# Patient Record
Sex: FEMALE | Race: WHITE | NOT HISPANIC OR LATINO | Employment: OTHER | URBAN - NONMETROPOLITAN AREA
[De-identification: names, ages, dates, MRNs, and addresses within clinical notes are randomized per-mention and may not be internally consistent; named-entity substitution may affect disease eponyms.]

---

## 2020-12-14 LAB
ALBUMIN SERPL-MCNC: 4.7 G/DL (ref 3.4–5)
ALBUMIN/GLOB SERPL: 1.5 {RATIO}
ALP SERPL-CCNC: 53 U/L (ref 50–144)
ALT SERPL-CCNC: 12 U/L (ref 1–45)
ANION GAP SERPL CALC-SCNC: 8 MMOL/L (ref 7–16)
AST SERPL-CCNC: 20 U/L (ref 14–36)
BASOPHILS # BLD AUTO: 0.02 X10(3)/MCL (ref 0.01–0.08)
BASOPHILS NFR BLD AUTO: 0.3 % (ref 0.1–1.2)
BILIRUB SERPL-MCNC: 0.53 MG/DL (ref 0.1–1)
BUN SERPL-MCNC: 17 MG/DL (ref 7–20)
CALCIUM SERPL-MCNC: 10.7 MG/DL (ref 8.4–10.2)
CHLORIDE SERPL-SCNC: 101 MMOL/L (ref 94–110)
CHOLEST SERPL-MCNC: 214 MG/DL (ref 0–200)
CO2 SERPL-SCNC: 31 MMOL/L (ref 21–32)
CREAT SERPL-MCNC: 0.8 MG/DL (ref 0.52–1.04)
CREAT/UREA NIT SERPL: 21.3 (ref 12–20)
DEPRECATED CALCIDIOL+CALCIFEROL SERPL-MC: 27.4 NG/ML (ref 30–100)
EOSINOPHIL # BLD AUTO: 0.18 X10(3)/MCL (ref 0.04–0.36)
EOSINOPHIL NFR BLD AUTO: 3 % (ref 0.7–7)
ERYTHROCYTE [DISTWIDTH] IN BLOOD BY AUTOMATED COUNT: 13.7 % (ref 11–14.5)
EST. AVERAGE GLUCOSE BLD GHB EST-MCNC: 105 MG/DL (ref 70–115)
GLOBULIN SER-MCNC: 3.1 G/DL (ref 2–3.9)
GLUCOSE SERPL-MCNC: 109 MGM./DL (ref 70–115)
HBA1C MFR BLD: 5.5 % (ref 4–6)
HCT VFR BLD AUTO: 45.1 % (ref 36–48)
HDLC SERPL-MCNC: 53 MG/DL (ref 40–60)
HGB BLD-MCNC: 15.1 G/DL (ref 11.8–16)
IMM GRANULOCYTES # BLD AUTO: 0.01 X10E3/UL (ref 0–0.03)
IMM GRANULOCYTES NFR BLD AUTO: 0.2 % (ref 0–0.5)
LDLC SERPL CALC-MCNC: 119.9 MG/DL (ref 30–100)
LYMPHOCYTES # BLD AUTO: 2.13 X10(3)/MCL (ref 1.16–3.74)
LYMPHOCYTES NFR BLD AUTO: 35.9 % (ref 20–55)
MCH RBC QN AUTO: 31.7 PG (ref 27–34)
MCHC RBC AUTO-ENTMCNC: 33.5 G/DL (ref 31–37)
MCV RBC AUTO: 94.7 FL (ref 79–99)
MONOCYTES # BLD AUTO: 0.39 X10(3)/MCL (ref 0.24–0.36)
MONOCYTES NFR BLD AUTO: 6.6 % (ref 4.7–12.5)
NEUTROPHILS # BLD AUTO: 3.21 X10(3)/MCL (ref 1.56–6.13)
NEUTROPHILS NFR BLD AUTO: 54 % (ref 37–73)
PLATELET # BLD AUTO: 218 X10(3)/MCL (ref 140–371)
PMV BLD AUTO: 12 FL (ref 9.4–12.4)
POTASSIUM SERPL-SCNC: 5.5 MMOL/L (ref 3.5–5.1)
PROT SERPL-MCNC: 7.8 G/DL (ref 6.3–8.2)
RBC # BLD AUTO: 4.76 X10(6)/MCL (ref 4–5.1)
SODIUM SERPL-SCNC: 140 MMOL/L (ref 135–145)
TRIGL SERPL-MCNC: 188 MG/DL (ref 30–200)
TSH SERPL-ACNC: 2.63 UIU/ML (ref 0.36–3.74)
WBC # SPEC AUTO: 5.9 X10(3)/MCL (ref 4–11.5)

## 2021-01-14 ENCOUNTER — HISTORICAL (OUTPATIENT)
Dept: ADMINISTRATIVE | Facility: HOSPITAL | Age: 71
End: 2021-01-14

## 2021-11-08 LAB
BILIRUB SERPL-MCNC: NEGATIVE MG/DL
BLOOD URINE, POC: NEGATIVE
CLARITY, POC UA: CLEAR
COLOR, POC UA: YELLOW
GLUCOSE UR QL STRIP: NEGATIVE
KETONES UR QL STRIP: NEGATIVE
LEUKOCYTE EST, POC UA: NEGATIVE
NITRITE, POC UA: NEGATIVE
PH, POC UA: 6
PROTEIN, POC: NEGATIVE
SPECIFIC GRAVITY, POC UA: 1.02
UROBILINOGEN, POC UA: NORMAL

## 2022-01-28 ENCOUNTER — HISTORICAL (OUTPATIENT)
Dept: ADMINISTRATIVE | Facility: HOSPITAL | Age: 72
End: 2022-01-28

## 2022-03-18 ENCOUNTER — HISTORICAL (OUTPATIENT)
Dept: ADMINISTRATIVE | Facility: HOSPITAL | Age: 72
End: 2022-03-18

## 2022-04-05 LAB
AGE: 69
ALBUMIN SERPL-MCNC: 4.6 G/DL (ref 3.4–5)
ALBUMIN/GLOB SERPL: 1.7 {RATIO}
ALP SERPL-CCNC: 59 U/L (ref 50–144)
ALT SERPL-CCNC: 12 U/L (ref 1–45)
ANION GAP SERPL CALC-SCNC: 8 MMOL/L (ref 2–13)
AST SERPL-CCNC: 20 U/L (ref 14–36)
BASOPHILS # BLD AUTO: 0.03 10*3/UL (ref 0.01–0.08)
BASOPHILS NFR BLD AUTO: 0.7 % (ref 0.1–1.2)
BILIRUB SERPL-MCNC: 0.67 MG/DL (ref 0–1)
BUN SERPL-MCNC: 19 MG/DL (ref 7–20)
CALCIUM SERPL-MCNC: 9.9 MG/DL (ref 8.4–10.2)
CHLORIDE SERPL-SCNC: 101 MMOL/L (ref 94–110)
CHOLEST SERPL-MCNC: 193 MG/DL (ref 0–200)
CO2 SERPL-SCNC: 28 MMOL/L (ref 21–32)
CREAT SERPL-MCNC: 0.94 MG/DL (ref 0.52–1.04)
CREAT/UREA NIT SERPL: 20.2 (ref 12–20)
DEPRECATED CALCIDIOL+CALCIFEROL SERPL-MC: 22.2 NG/ML (ref 30–100)
EOSINOPHIL # BLD AUTO: 0.15 10*3/UL (ref 0.04–0.36)
EOSINOPHIL NFR BLD AUTO: 3.3 % (ref 0.7–7)
ERYTHROCYTE [DISTWIDTH] IN BLOOD BY AUTOMATED COUNT: 14 % (ref 11–14.5)
EST. AVERAGE GLUCOSE BLD GHB EST-MCNC: 105 MG/DL (ref 70–115)
GLOBULIN SER-MCNC: 2.7 G/DL (ref 2–3.9)
GLUCOSE SERPL-MCNC: 107 MG/DL (ref 70–115)
HBA1C MFR BLD: 5.5 % (ref 4–6)
HCT VFR BLD AUTO: 42.1 % (ref 36–48)
HDLC SERPL-MCNC: 50 MG/DL (ref 40–60)
HGB BLD-MCNC: 14.3 G/DL (ref 11.8–16)
IMM GRANULOCYTES # BLD AUTO: 0.01 10*3/UL (ref 0–0.03)
IMM GRANULOCYTES NFR BLD AUTO: 0.2 % (ref 0–0.5)
LDLC SERPL CALC-MCNC: 100.8 MG/DL (ref 30–100)
LYMPHOCYTES # BLD AUTO: 1.68 10*3/UL (ref 1.16–3.74)
LYMPHOCYTES NFR BLD AUTO: 37.4 % (ref 20–55)
MANUAL DIFF? (OHS): NORMAL
MCH RBC QN AUTO: 31.4 PG (ref 27–34)
MCHC RBC AUTO-ENTMCNC: 34 G/DL (ref 31–37)
MCV RBC AUTO: 92.3 FL (ref 79–99)
MONOCYTES # BLD AUTO: 0.36 10*3/UL (ref 0.24–0.36)
MONOCYTES NFR BLD AUTO: 8 % (ref 4.7–12.5)
NEUTROPHILS # BLD AUTO: 2.26 10*3/UL (ref 1.56–6.13)
NEUTROPHILS NFR BLD AUTO: 50.4 % (ref 37–73)
PLATELET # BLD AUTO: 230 10*3/UL (ref 140–371)
PMV BLD AUTO: 12.1 FL (ref 9.4–12.4)
POTASSIUM SERPL-SCNC: 5.3 MMOL/L (ref 3.5–5.1)
PROT SERPL-MCNC: 7.3 G/DL (ref 6.3–8.2)
RBC # BLD AUTO: 4.56 10*6/UL (ref 4–5.1)
SODIUM SERPL-SCNC: 137 MMOL/L (ref 135–145)
TRIGL SERPL-MCNC: 135 MG/DL (ref 30–200)
TSH SERPL-ACNC: 1.77 M[IU]/L (ref 0.36–3.74)
WBC # SPEC AUTO: 4.5 10*3/UL (ref 4–11.5)

## 2022-04-11 ENCOUNTER — HISTORICAL (OUTPATIENT)
Dept: ADMINISTRATIVE | Facility: HOSPITAL | Age: 72
End: 2022-04-11

## 2022-04-27 VITALS
SYSTOLIC BLOOD PRESSURE: 116 MMHG | OXYGEN SATURATION: 94 % | WEIGHT: 174 LBS | DIASTOLIC BLOOD PRESSURE: 64 MMHG | BODY MASS INDEX: 32.02 KG/M2 | HEIGHT: 62 IN

## 2022-05-09 ENCOUNTER — HISTORICAL (OUTPATIENT)
Dept: ADMINISTRATIVE | Facility: HOSPITAL | Age: 72
End: 2022-05-09

## 2022-09-22 ENCOUNTER — HISTORICAL (OUTPATIENT)
Dept: ADMINISTRATIVE | Facility: HOSPITAL | Age: 72
End: 2022-09-22

## 2023-02-01 RX ORDER — ASPIRIN 81 MG/1
81 TABLET ORAL DAILY
COMMUNITY
End: 2023-08-22

## 2023-02-01 RX ORDER — ATORVASTATIN CALCIUM 40 MG/1
40 TABLET, FILM COATED ORAL
COMMUNITY
Start: 2022-10-04 | End: 2023-02-27 | Stop reason: SDUPTHER

## 2023-02-01 RX ORDER — BUSPIRONE HYDROCHLORIDE 15 MG/1
15 TABLET ORAL
COMMUNITY
Start: 2022-10-04 | End: 2023-02-27 | Stop reason: SDUPTHER

## 2023-02-01 RX ORDER — METOPROLOL SUCCINATE 25 MG/1
25 TABLET, EXTENDED RELEASE ORAL
COMMUNITY
Start: 2022-10-04 | End: 2023-02-27 | Stop reason: SDUPTHER

## 2023-02-01 RX ORDER — CHOLECALCIFEROL (VITAMIN D3) 25 MCG
2000 TABLET ORAL DAILY
COMMUNITY
End: 2023-08-22

## 2023-02-01 RX ORDER — PANTOPRAZOLE SODIUM 40 MG/1
40 TABLET, DELAYED RELEASE ORAL DAILY
COMMUNITY
End: 2023-02-27 | Stop reason: SDUPTHER

## 2023-02-01 RX ORDER — GLYCOPYRROLATE AND FORMOTEROL FUMARATE 9; 4.8 UG/1; UG/1
AEROSOL, METERED RESPIRATORY (INHALATION)
COMMUNITY
Start: 2022-10-04 | End: 2023-02-01

## 2023-02-27 DIAGNOSIS — I10 HYPERTENSION, UNSPECIFIED TYPE: ICD-10-CM

## 2023-02-27 DIAGNOSIS — E78.5 HYPERLIPIDEMIA, UNSPECIFIED HYPERLIPIDEMIA TYPE: Primary | ICD-10-CM

## 2023-02-27 DIAGNOSIS — F41.8 MIXED ANXIETY AND DEPRESSIVE DISORDER: ICD-10-CM

## 2023-02-27 DIAGNOSIS — K21.9 GASTROESOPHAGEAL REFLUX DISEASE WITHOUT ESOPHAGITIS: ICD-10-CM

## 2023-02-27 RX ORDER — METOPROLOL SUCCINATE 25 MG/1
25 TABLET, EXTENDED RELEASE ORAL DAILY
Qty: 90 TABLET | Refills: 0 | Status: SHIPPED | OUTPATIENT
Start: 2023-02-27 | End: 2023-04-24 | Stop reason: SDUPTHER

## 2023-02-27 RX ORDER — PANTOPRAZOLE SODIUM 40 MG/1
40 TABLET, DELAYED RELEASE ORAL DAILY
Qty: 90 TABLET | Refills: 0 | Status: SHIPPED | OUTPATIENT
Start: 2023-02-27 | End: 2023-04-24

## 2023-02-27 RX ORDER — ATORVASTATIN CALCIUM 40 MG/1
40 TABLET, FILM COATED ORAL DAILY
Qty: 90 TABLET | Refills: 0 | Status: SHIPPED | OUTPATIENT
Start: 2023-02-27 | End: 2023-04-24 | Stop reason: SDUPTHER

## 2023-02-27 RX ORDER — BUSPIRONE HYDROCHLORIDE 15 MG/1
15 TABLET ORAL 2 TIMES DAILY
Qty: 60 TABLET | Refills: 2 | Status: SHIPPED | OUTPATIENT
Start: 2023-02-27 | End: 2023-04-24

## 2023-03-27 DIAGNOSIS — F32.A ANXIETY AND DEPRESSION: Primary | ICD-10-CM

## 2023-03-27 DIAGNOSIS — F41.9 ANXIETY AND DEPRESSION: Primary | ICD-10-CM

## 2023-03-27 RX ORDER — BUPROPION HYDROCHLORIDE 150 MG/1
150 TABLET, EXTENDED RELEASE ORAL 2 TIMES DAILY
Qty: 60 TABLET | Refills: 0 | Status: SHIPPED | OUTPATIENT
Start: 2023-03-27 | End: 2023-04-24 | Stop reason: DRUGHIGH

## 2023-04-17 PROCEDURE — 83036 HEMOGLOBIN GLYCOSYLATED A1C: CPT | Performed by: NURSE PRACTITIONER

## 2023-04-17 PROCEDURE — 84443 ASSAY THYROID STIM HORMONE: CPT | Performed by: NURSE PRACTITIONER

## 2023-04-17 PROCEDURE — 80061 LIPID PANEL: CPT | Performed by: NURSE PRACTITIONER

## 2023-04-17 PROCEDURE — 82306 VITAMIN D 25 HYDROXY: CPT | Performed by: NURSE PRACTITIONER

## 2023-04-17 PROCEDURE — 80053 COMPREHEN METABOLIC PANEL: CPT | Performed by: NURSE PRACTITIONER

## 2023-04-24 ENCOUNTER — OFFICE VISIT (OUTPATIENT)
Dept: FAMILY MEDICINE | Facility: CLINIC | Age: 73
End: 2023-04-24
Payer: COMMERCIAL

## 2023-04-24 VITALS
WEIGHT: 187 LBS | BODY MASS INDEX: 35.3 KG/M2 | RESPIRATION RATE: 20 BRPM | OXYGEN SATURATION: 98 % | SYSTOLIC BLOOD PRESSURE: 124 MMHG | HEART RATE: 101 BPM | TEMPERATURE: 97 F | DIASTOLIC BLOOD PRESSURE: 74 MMHG | HEIGHT: 61 IN

## 2023-04-24 DIAGNOSIS — E78.5 HYPERLIPIDEMIA, UNSPECIFIED HYPERLIPIDEMIA TYPE: ICD-10-CM

## 2023-04-24 DIAGNOSIS — Z86.39 HISTORY OF IRON DEFICIENCY: ICD-10-CM

## 2023-04-24 DIAGNOSIS — K21.9 GASTROESOPHAGEAL REFLUX DISEASE WITHOUT ESOPHAGITIS: ICD-10-CM

## 2023-04-24 DIAGNOSIS — Z12.11 COLON CANCER SCREENING: ICD-10-CM

## 2023-04-24 DIAGNOSIS — L98.9 NON-HEALING SKIN LESION OF NOSE: ICD-10-CM

## 2023-04-24 DIAGNOSIS — J43.9 PULMONARY EMPHYSEMA, UNSPECIFIED EMPHYSEMA TYPE: ICD-10-CM

## 2023-04-24 DIAGNOSIS — B37.31 VAGINAL CANDIDIASIS: ICD-10-CM

## 2023-04-24 DIAGNOSIS — I10 HYPERTENSION, UNSPECIFIED TYPE: ICD-10-CM

## 2023-04-24 DIAGNOSIS — Z86.39 HISTORY OF NON ANEMIC VITAMIN B12 DEFICIENCY: ICD-10-CM

## 2023-04-24 DIAGNOSIS — Z00.00 MEDICARE ANNUAL WELLNESS VISIT, SUBSEQUENT: Primary | ICD-10-CM

## 2023-04-24 DIAGNOSIS — F41.9 ANXIETY AND DEPRESSION: ICD-10-CM

## 2023-04-24 DIAGNOSIS — J44.9 CHRONIC OBSTRUCTIVE PULMONARY DISEASE, UNSPECIFIED COPD TYPE: ICD-10-CM

## 2023-04-24 DIAGNOSIS — R39.9 UTI SYMPTOMS: ICD-10-CM

## 2023-04-24 DIAGNOSIS — F32.A ANXIETY AND DEPRESSION: ICD-10-CM

## 2023-04-24 DIAGNOSIS — F17.210 SMOKING GREATER THAN 30 PACK YEARS: ICD-10-CM

## 2023-04-24 LAB
BASOPHILS # BLD AUTO: 0.04 X10(3)/MCL (ref 0.01–0.08)
BASOPHILS NFR BLD AUTO: 0.7 % (ref 0.1–1.2)
BILIRUB SERPL-MCNC: NORMAL MG/DL
BLOOD URINE, POC: NORMAL
CLARITY, POC UA: NORMAL
COLOR, POC UA: YELLOW
EOSINOPHIL # BLD AUTO: 0.13 X10(3)/MCL (ref 0.04–0.36)
EOSINOPHIL NFR BLD AUTO: 2.4 % (ref 0.7–7)
ERYTHROCYTE [DISTWIDTH] IN BLOOD BY AUTOMATED COUNT: 13.4 % (ref 11–14.5)
FERRITIN SERPL-MCNC: 17.7 NG/ML (ref 6.24–264)
FOLATE SERPL-MCNC: 5.4 NG/ML (ref 2.8–20)
GLUCOSE UR QL STRIP: NORMAL
HCT VFR BLD AUTO: 37.7 % (ref 36–48)
HGB BLD-MCNC: 12.7 G/DL (ref 11.8–16)
IMM GRANULOCYTES # BLD AUTO: 0.01 X10(3)/MCL (ref 0–0.03)
IMM GRANULOCYTES NFR BLD AUTO: 0.2 % (ref 0–0.5)
IRON SATN MFR SERPL: 18 % (ref 20–50)
IRON SERPL-MCNC: 57 UG/DL (ref 50–170)
KETONES UR QL STRIP: NORMAL
LEUKOCYTE ESTERASE URINE, POC: NORMAL
LYMPHOCYTES # BLD AUTO: 1.64 X10(3)/MCL (ref 1.16–3.74)
LYMPHOCYTES NFR BLD AUTO: 30.1 % (ref 20–55)
MCH RBC QN AUTO: 30.3 PG (ref 27–34)
MCV RBC AUTO: 90 FL (ref 79–99)
MEAN CELL HEMOGLOBIN CONCENTRATION (OHS) G/DL: 33.7 G/DL (ref 31–37)
MONOCYTES # BLD AUTO: 0.46 X10(3)/MCL (ref 0.24–0.36)
MONOCYTES NFR BLD AUTO: 8.5 % (ref 4.7–12.5)
NEUTROPHILS # BLD AUTO: 3.16 X10(3)/MCL (ref 1.56–6.13)
NEUTROPHILS NFR BLD AUTO: 58.1 % (ref 37–73)
NITRITE, POC UA: NORMAL
NRBC BLD AUTO-RTO: 0 % (ref 0–1)
PH, POC UA: 6
PLATELET # BLD AUTO: 260 X10(3)/MCL (ref 140–371)
PMV BLD AUTO: 11.5 FL (ref 9.4–12.4)
PROTEIN, POC: NORMAL
RBC # BLD AUTO: 4.19 X10(6)/MCL (ref 4–5.1)
SPECIFIC GRAVITY, POC UA: 1.02
TIBC SERPL-MCNC: 257 UG/DL (ref 70–310)
TIBC SERPL-MCNC: 314 UG/DL (ref 250–450)
TRANSFERRIN SERPL-MCNC: 285 MG/DL (ref 173–360)
UROBILINOGEN, POC UA: 0.2
VIT B12 SERPL-MCNC: 554 PG/ML (ref 211–946)
WBC # SPEC AUTO: 5.4 X10(3)/MCL (ref 4–11.5)

## 2023-04-24 PROCEDURE — 3288F FALL RISK ASSESSMENT DOCD: CPT | Mod: ,,, | Performed by: NURSE PRACTITIONER

## 2023-04-24 PROCEDURE — 1160F RVW MEDS BY RX/DR IN RCRD: CPT | Mod: ,,, | Performed by: NURSE PRACTITIONER

## 2023-04-24 PROCEDURE — G0439 PR MEDICARE ANNUAL WELLNESS SUBSEQUENT VISIT: ICD-10-PCS | Mod: ,,, | Performed by: NURSE PRACTITIONER

## 2023-04-24 PROCEDURE — 1160F PR REVIEW ALL MEDS BY PRESCRIBER/CLIN PHARMACIST DOCUMENTED: ICD-10-PCS | Mod: ,,, | Performed by: NURSE PRACTITIONER

## 2023-04-24 PROCEDURE — 1101F PR PT FALLS ASSESS DOC 0-1 FALLS W/OUT INJ PAST YR: ICD-10-PCS | Mod: ,,, | Performed by: NURSE PRACTITIONER

## 2023-04-24 PROCEDURE — G0439 PPPS, SUBSEQ VISIT: HCPCS | Mod: ,,, | Performed by: NURSE PRACTITIONER

## 2023-04-24 PROCEDURE — 3078F DIAST BP <80 MM HG: CPT | Mod: ,,, | Performed by: NURSE PRACTITIONER

## 2023-04-24 PROCEDURE — 3008F PR BODY MASS INDEX (BMI) DOCUMENTED: ICD-10-PCS | Mod: ,,, | Performed by: NURSE PRACTITIONER

## 2023-04-24 PROCEDURE — 3074F SYST BP LT 130 MM HG: CPT | Mod: ,,, | Performed by: NURSE PRACTITIONER

## 2023-04-24 PROCEDURE — 3074F PR MOST RECENT SYSTOLIC BLOOD PRESSURE < 130 MM HG: ICD-10-PCS | Mod: ,,, | Performed by: NURSE PRACTITIONER

## 2023-04-24 PROCEDURE — 1101F PT FALLS ASSESS-DOCD LE1/YR: CPT | Mod: ,,, | Performed by: NURSE PRACTITIONER

## 2023-04-24 PROCEDURE — 1123F ACP DISCUSS/DSCN MKR DOCD: CPT | Mod: ,,, | Performed by: NURSE PRACTITIONER

## 2023-04-24 PROCEDURE — 3288F PR FALLS RISK ASSESSMENT DOCUMENTED: ICD-10-PCS | Mod: ,,, | Performed by: NURSE PRACTITIONER

## 2023-04-24 PROCEDURE — 3078F PR MOST RECENT DIASTOLIC BLOOD PRESSURE < 80 MM HG: ICD-10-PCS | Mod: ,,, | Performed by: NURSE PRACTITIONER

## 2023-04-24 PROCEDURE — 1159F PR MEDICATION LIST DOCUMENTED IN MEDICAL RECORD: ICD-10-PCS | Mod: ,,, | Performed by: NURSE PRACTITIONER

## 2023-04-24 PROCEDURE — 3008F BODY MASS INDEX DOCD: CPT | Mod: ,,, | Performed by: NURSE PRACTITIONER

## 2023-04-24 PROCEDURE — 81002 URINALYSIS NONAUTO W/O SCOPE: CPT | Mod: RHCUB | Performed by: NURSE PRACTITIONER

## 2023-04-24 PROCEDURE — 1159F MED LIST DOCD IN RCRD: CPT | Mod: ,,, | Performed by: NURSE PRACTITIONER

## 2023-04-24 PROCEDURE — 1123F PR ADV CARE PLAN DISCUSSED, PLAN OR SURROGATE DOCUMENTED: ICD-10-PCS | Mod: ,,, | Performed by: NURSE PRACTITIONER

## 2023-04-24 RX ORDER — GLYCOPYRROLATE AND FORMOTEROL FUMARATE 9; 4.8 UG/1; UG/1
2 AEROSOL, METERED RESPIRATORY (INHALATION) 2 TIMES DAILY
Qty: 10.7 G | Refills: 1 | Status: SHIPPED | OUTPATIENT
Start: 2023-04-24 | End: 2023-08-22

## 2023-04-24 RX ORDER — DILTIAZEM HYDROCHLORIDE 180 MG/1
180 CAPSULE, COATED, EXTENDED RELEASE ORAL DAILY
COMMUNITY
Start: 2023-02-09 | End: 2023-04-24 | Stop reason: SDUPTHER

## 2023-04-24 RX ORDER — DILTIAZEM HYDROCHLORIDE 180 MG/1
180 CAPSULE, COATED, EXTENDED RELEASE ORAL DAILY
Qty: 90 CAPSULE | Refills: 1 | Status: SHIPPED | OUTPATIENT
Start: 2023-04-24 | End: 2023-08-22

## 2023-04-24 RX ORDER — BUPROPION HYDROCHLORIDE 300 MG/1
300 TABLET ORAL DAILY
Qty: 90 TABLET | Refills: 1 | Status: SHIPPED | OUTPATIENT
Start: 2023-04-24 | End: 2023-08-22

## 2023-04-24 RX ORDER — OMEPRAZOLE 20 MG/1
20 CAPSULE, DELAYED RELEASE ORAL DAILY
Qty: 90 CAPSULE | Refills: 1 | Status: SHIPPED | OUTPATIENT
Start: 2023-04-24 | End: 2023-07-26 | Stop reason: SDUPTHER

## 2023-04-24 RX ORDER — METOPROLOL SUCCINATE 25 MG/1
25 TABLET, EXTENDED RELEASE ORAL DAILY
Qty: 90 TABLET | Refills: 1 | Status: SHIPPED | OUTPATIENT
Start: 2023-04-24 | End: 2023-04-24 | Stop reason: SDUPTHER

## 2023-04-24 RX ORDER — OMEPRAZOLE 20 MG/1
20 CAPSULE, DELAYED RELEASE ORAL DAILY
COMMUNITY
End: 2023-04-24 | Stop reason: SDUPTHER

## 2023-04-24 RX ORDER — FLUCONAZOLE 150 MG/1
150 TABLET ORAL DAILY
Qty: 2 TABLET | Refills: 0 | Status: SHIPPED | OUTPATIENT
Start: 2023-04-24 | End: 2023-04-26

## 2023-04-24 RX ORDER — ATORVASTATIN CALCIUM 40 MG/1
40 TABLET, FILM COATED ORAL DAILY
Qty: 90 TABLET | Refills: 1 | Status: SHIPPED | OUTPATIENT
Start: 2023-04-24 | End: 2023-04-24 | Stop reason: SDUPTHER

## 2023-04-24 RX ORDER — GLYCOPYRROLATE AND FORMOTEROL FUMARATE 9; 4.8 UG/1; UG/1
2 AEROSOL, METERED RESPIRATORY (INHALATION) 2 TIMES DAILY
Qty: 10.7 G | Refills: 1 | Status: SHIPPED | OUTPATIENT
Start: 2023-04-24 | End: 2023-04-24 | Stop reason: SDUPTHER

## 2023-04-24 RX ORDER — METOPROLOL SUCCINATE 25 MG/1
25 TABLET, EXTENDED RELEASE ORAL DAILY
Qty: 90 TABLET | Refills: 1 | Status: SHIPPED | OUTPATIENT
Start: 2023-04-24 | End: 2023-08-22

## 2023-04-24 RX ORDER — ATORVASTATIN CALCIUM 40 MG/1
40 TABLET, FILM COATED ORAL DAILY
Qty: 90 TABLET | Refills: 1 | Status: SHIPPED | OUTPATIENT
Start: 2023-04-24 | End: 2024-03-25

## 2023-04-24 NOTE — PROGRESS NOTES
Also notify patient iron saturation slightly low, but iron good at 57, no anemia, ferritin sufficient, folate good, and b12 normal at 554, no b12 deficiency at this time. With increased fatigue may need to consider returning to cardiologist for additional evaluation.

## 2023-04-24 NOTE — PROGRESS NOTES
Subjective:       Patient ID: Natalia Weller is a 72 y.o. female.    Chief Complaint: Medicare AWV (Pt here for medicare wellness - she also thinks she has a yeast infect (dude to smell and itch) no other problems or concerns today. )    The patient returns for Medicare wellness follow-up also with chronic conditions.  She has had a history of renal cell cancer and rectal mass that was removed by Dr. Soriano 2019.  She was scheduled to return to Dr. Roberts for rectal mass follow-up colonoscopy in December 22 however she had transportation issues and never followed up.  She is still finding trouble with transportation beyond [place] and request local providers.    She has been smoking at least 30 pack years and has a slight cough with complaint of shortness of breath with ambulation.  She stopped using her inhalers over time for emphysema.  She still smokes 3-4 cigarettes per day and refuses any cessation at this time.  She has quit in the past but not ready to start anything new today.  She has no night sweats PND chest pain and we will restart preventative treatment for emphysema.  She has not had any blood pressure or chest pain and has not returned to her cardiologist in some time but does not want to return at this time.  She reports that she has been gaining weight but attributes this to attempts to quit smoking.  She also reports foul-smelling urine with vaginal itch and beginning to burn in the last 2-3 weeks.  She does feel that she is emptying her bladder well.  There is no fever nausea blood in stool nor urine.  She has not seen a gyn in some time and declines at this time a pelvic exam.        Past Medical History:  has a past medical history of Anxiety, COPD (chronic obstructive pulmonary disease), Emphysema of lung, Insomnia, and Renal cyst.    Surgical History:  has a past surgical history that includes Colonoscopy; Appendectomy; Cataract extraction; Hysterectomy; Nephrectomy (Left); sigmoidoscopy and  biopsy; and fiberoptic colonoscopy with biopsy.    Family History: family history includes Colon cancer in her mother; Diabetes type II in her mother; Heart disease in her mother.    Social History:  reports that she has been smoking cigarettes. She has never used smokeless tobacco. She reports current alcohol use. She reports that she does not use drugs.    Current Outpatient Medications   Medication Instructions    aspirin (ECOTRIN) 81 mg, Oral, Daily    atorvastatin (LIPITOR) 40 mg, Oral, Daily    buPROPion (WELLBUTRIN XL) 300 mg, Oral, Daily    diltiaZEM (CARDIZEM CD) 180 mg, Oral, Daily    fluconazole (DIFLUCAN) 150 mg, Oral, Daily    glycopyrrolate-formoteroL (BEVESPI AEROSPHERE) 9-4.8 mcg HFAA 2 puffs, Inhalation, 2 times daily, Controller    metoprolol succinate (TOPROL-XL) 25 mg, Oral, Daily    omeprazole (PRILOSEC) 20 mg, Oral, Daily    vitamin D (VITAMIN D3) 2,000 Units, Oral, Daily       Patient is allergic to adhesive.     Patient Care Team:  Jaclyn Casiano DNP as PCP - General (Family Medicine)  LANA Linares as Nurse Practitioner (Cardiology)    Patient Reported Health Risk Assessments:  [age]  Are you male or female?: Female  During the past four weeks, how much have you been bothered by emotional problems such as feeling anxious, depressed, irritable, sad, or downhearted and blue?: Not at all  During the past five weeks, has your physical and/or emotional health limited your social activities with family, friends, neighbors, or groups?: Not at all  During the past four weeks, how much bodily pain have you generally had?: Moderate pain  During the past four weeks, was someone available to help if you needed and wanted help?: Yes, some  During the past four weeks, what was the hardest physical activity you could do for at least two minutes?: Very light  Can you get to places out of walking distance without help?  (For example, can you travel alone on buses or taxis, or drive  your own car?): Yes  Can you go shopping for groceries or clothes without someone's help?: Yes  Can you prepare your own meals?: Yes  Can you do your own housework without help?: Yes  Because of any health problems, do you need the help of another person with your personal care needs such as eating, bathing, dressing, or getting around the house?: No  Can you handle your own money without help?: Yes  During the past four weeks, how would you rate your health in general?: Fair  How have things been going for you during the past four weeks?: Pretty well  Are you having difficulties driving your car?: No  Do you always fasten your seat belt when you are in a car?: Yes, usually  How often in the past four weeks have you been bothered by falling or dizzy when standing up?: Sometimes  How often in the past four weeks have you been bothered by sexual problems?: Never  How often in the past four weeks have you been bothered by trouble eating well?: Never  How often in the past four weeks have you been bothered by teeth or denture problems?: Always  How often in the past four weeks have you been bothered with problems using the telephone?: Never  How often in the past four weeks have you been bothered by tiredness or fatigue?: Never  Have you fallen two or more times in the past year?: No  Are you afraid of falling?: Yes  Are you a smoker?: Yes, I'm not ready to quit  During the past four weeks, how many drinks of wine, beer, or other alcoholic beverages did you have?: 2-5 drinks per weeks  Do you exercise for about 20 minutes three or more days a week?: No, I usually do not exercise this much  Have you been given any information to help you with hazards in your house that might hurt you?: Yes  Have you been given any information to help you with keeping track of your medications?: No  How often do you have trouble taking medicines the way you've been told to take them?: Sometimes I take them as prescribed  How confident are  you that you can control and manage most of your health problems?: Somewhat confident  What is your race? (Check all that apply.):     Review of Systems   Constitutional:  Positive for chills, diaphoresis, fatigue and fever. Negative for activity change, appetite change and unexpected weight change.   HENT:  Negative for ear discharge, hearing loss, postnasal drip, sneezing, sore throat, trouble swallowing and voice change.    Eyes:  Negative for redness and eye dryness.   Respiratory:  Positive for cough and shortness of breath. Negative for chest tightness and wheezing.    Cardiovascular:  Positive for chest pain. Negative for leg swelling.   Gastrointestinal:  Positive for abdominal distention, abdominal pain and reflux. Negative for blood in stool, change in bowel habit, constipation, diarrhea, nausea, rectal pain, vomiting, fecal incontinence and change in bowel habit.   Endocrine: Positive for polyphagia and polyuria. Negative for cold intolerance, heat intolerance and polydipsia.   Genitourinary:  Positive for decreased urine volume and urgency. Negative for bladder incontinence, dysuria, enuresis, frequency, hematuria and pelvic pain.   Musculoskeletal:  Positive for arthralgias. Negative for gait problem and myalgias.   Integumentary:  Positive for mole/lesion (scaliness to tip of nose). Negative for wound, breast mass and breast tenderness.   Allergic/Immunologic: Positive for environmental allergies. Negative for food allergies.   Neurological:  Positive for tremors and syncope. Negative for speech difficulty, weakness, light-headedness, coordination difficulties, memory loss and coordination difficulties.   Hematological:  Negative for adenopathy. Does not bruise/bleed easily.   Psychiatric/Behavioral:  Positive for agitation, behavioral problems, dysphoric mood, sleep disturbance and suicidal ideas. The patient is nervous/anxious.    Breast: Negative for mass and tenderness      Objective:       Physical Exam  Vitals and nursing note reviewed. Exam conducted with a chaperone present.   Constitutional:       Appearance: She is obese.   HENT:      Head: Normocephalic.      Right Ear: Tympanic membrane normal.      Left Ear: Tympanic membrane normal.      Nose: Nose normal.      Mouth/Throat:      Mouth: Mucous membranes are moist.      Pharynx: Oropharynx is clear.   Eyes:      Extraocular Movements: Extraocular movements intact.      Conjunctiva/sclera: Conjunctivae normal.   Cardiovascular:      Rate and Rhythm: Normal rate and regular rhythm.      Pulses: Normal pulses.   Pulmonary:      Effort: Pulmonary effort is normal.      Breath sounds: Normal breath sounds.   Chest:   Breasts:     Right: Normal.      Left: Normal.   Abdominal:      General: Bowel sounds are normal.      Palpations: Abdomen is soft.   Musculoskeletal:         General: Normal range of motion.      Cervical back: Normal range of motion.   Lymphadenopathy:      Upper Body:      Right upper body: No supraclavicular, axillary or pectoral adenopathy.      Left upper body: No supraclavicular, axillary or pectoral adenopathy.   Skin:     General: Skin is warm and dry.      Capillary Refill: Capillary refill takes 2 to 3 seconds.      Coloration: Skin is pale.      Findings: Lesion present. No erythema. Rash is not urticarial.      Nails: There is no clubbing.             Comments: Dry scaly flesh colored raised lesion approximately 6 mm to tip of nose.    Neurological:      Mental Status: She is alert and oriented to person, place, and time.   Psychiatric:         Mood and Affect: Mood normal.         Behavior: Behavior normal.         Thought Content: Thought content normal.       Assessment/Plan:     Vitals:    04/24/23 0933   BP: 124/74   Pulse: 101   Resp: 20   Temp: 97 °F (36.1 °C)        Fall Risk + Home Safety + Living Situation + Whisper Test + Depression Screen + CAGE + Cognitive Impairment Screen + ADL Screen + Timed Get Up and  Go + Nutrition Screen + PAQ Screen + Health Risk Assessment all reviewed.     No flowsheet data found.  Fall Risk Assessment - Outpatient 4/24/2023   Mobility Status Ambulatory   Number of falls 0   Identified as fall risk 0             Depression Screening  Over the past two weeks, has the patient felt down, depressed, or hopeless?: No  Over the past two weeks, has the patient felt little interest or pleasure in doing things?: No  Functional Ability/Safety Screening  Was the patient's timed Up & Go test unsteady or longer than 30 seconds?: No  Does the patient need help with phone, transportation, shopping, preparing meals, housework, laundry, meds, or managing money?: No  Does the patient's home have rugs in the hallway, lack grab bars in the bathroom, lack handrails on the stairs or have poor lighting?: No  Have you noticed any hearing difficulties?: No  Cognitive Function (Assessed through direct observation with due consideration of information obtained by way of patient reports and/or concerns raised by family, friends, caretakers, or others)    Does the patient repeat questions/statements in the same day?: No  Does the patient have trouble remembering the date, year, and time?: Yes  Does the patient have difficulty managing finances?: No  Does the patient have a decreased sense of direction?: No       Opioid Screening: Patient medication list reviewed, patient is not taking prescription opioids. Patient is not using additional opioids than prescribed. Patient is not at low risk of substance abuse based on this opioid use history.     The patient's Health Maintenance was reviewed and the following appears to be due at this time:   Health Maintenance Due   Topic Date Due    Hepatitis C Screening  Never done    COVID-19 Vaccine (1) Never done    DEXA Scan  Never done    Shingles Vaccine (1 of 2) Never done    Mammogram  01/14/2022       Advance Directives:   Living Will: No        Oral Declaration: Yes  [place]:  No    Do Not Resuscitate Status: No    Medical Power of : No        Oral Declaration: No      Decision Making:  Patient answered questions  Goals of Care: The patient endorses that what is most important right now is to focus on quality of life, even if it means sacrificing a little time    Accordingly, we have decided that the best plan to meet the patient's goals includes continuing with treatment  Advance Care Planning   Advanced Care Planning: I offered to discuss Advanced Care Planning, which consists of how you would like to be cared for as you end the near of your natural life.  This includes how to pick a person who would make decisions for you if you were unable to make them for yourself, which is called a Medical Power of . These discussions include what kind of decisions you will make regarding life sustaining measures, such as ventilators and feeding tubes, when faced with a life limiting illness recorded on a living will that they will need to know.         1. Medicare annual wellness visit, subsequent  Assessment & Plan:  Need mammogram, history of rectal mass with Walter Benjamin in 2019. Were planning to have repeat coloconcopy with Dr Roberts in 2020, 6 months later but lost since moved. Too difficult to get out of town appointments. Will refer to Dr Blakely.     Orders:  -     Mammo Digital Screening Bilat; Future; Expected date: 04/24/2023    2. UTI symptoms  -     POCT urine dipstick without microscope    3. Vaginal candidiasis  Assessment & Plan:  Started 3 weeks ago with increased itching and starting to become irritated.     Orders:  -     fluconazole (DIFLUCAN) 150 MG Tab; Take 1 tablet (150 mg total) by mouth once daily. for 2 days  Dispense: 2 tablet; Refill: 0    4. History of non anemic vitamin B12 deficiency  Assessment & Plan:  No blood in stool, none in urine. Tried B12 and not working, increased fatigue. B12 injection in past helped. Will notify with lab when  available.     Orders:  -     Vitamin B12    5. Pulmonary emphysema, unspecified emphysema type  Assessment & Plan:  Restart using inhale, bevespi two puffs twice daily. Wean from smoking 3 cigarettes daily.       6. Smoking greater than 30 pack years  -     CT Chest Lung Screening Low Dose; Future; Expected date: 04/24/2023    7. Colon cancer screening  -     CT Abdomen Pelvis With Contrast; Future; Expected date: 04/24/2023    8. Hyperlipidemia, unspecified hyperlipidemia type  -     Discontinue: atorvastatin (LIPITOR) 40 MG tablet; Take 1 tablet (40 mg total) by mouth once daily.  Dispense: 90 tablet; Refill: 1  -     atorvastatin (LIPITOR) 40 MG tablet; Take 1 tablet (40 mg total) by mouth once daily.  Dispense: 90 tablet; Refill: 1    9. Anxiety and depression  -     buPROPion (WELLBUTRIN XL) 300 MG 24 hr tablet; Take 1 tablet (300 mg total) by mouth once daily.  Dispense: 90 tablet; Refill: 1    10. Hypertension, unspecified type  -     diltiaZEM (CARDIZEM CD) 180 MG 24 hr capsule; Take 1 capsule (180 mg total) by mouth once daily.  Dispense: 90 capsule; Refill: 1  -     Discontinue: metoprolol succinate (TOPROL-XL) 25 MG 24 hr tablet; Take 1 tablet (25 mg total) by mouth once daily.  Dispense: 90 tablet; Refill: 1  -     metoprolol succinate (TOPROL-XL) 25 MG 24 hr tablet; Take 1 tablet (25 mg total) by mouth once daily.  Dispense: 90 tablet; Refill: 1    11. Gastroesophageal reflux disease without esophagitis  -     omeprazole (PRILOSEC) 20 MG capsule; Take 1 capsule (20 mg total) by mouth once daily.  Dispense: 90 capsule; Refill: 1    12. History of iron deficiency  -     CBC Auto Differential  -     Ferritin  -     Iron and TIBC  -     Folate    13. Non-healing skin lesion of nose  Assessment & Plan:  Refer to Dr Blakely for further evaluation and possible biopsy nasal skin lesion. Patient reports scaling with intermittent crusting intermittently for past 3 years or more. No past treatment.       14.  Chronic obstructive pulmonary disease, unspecified COPD type  Assessment & Plan:  Restart using inhale, bevespi two puffs twice daily. Wean from smoking 3 cigarettes daily.     Orders:  -     Discontinue: glycopyrrolate-formoteroL (BEVESPI AEROSPHERE) 9-4.8 mcg HFAA; Inhale 2 puffs into the lungs 2 (two) times a day. Controller  Dispense: 10.7 g; Refill: 1  -     glycopyrrolate-formoteroL (BEVESPI AEROSPHERE) 9-4.8 mcg HFAA; Inhale 2 puffs into the lungs 2 (two) times a day. Controller  Dispense: 10.7 g; Refill: 1             Provided Natalia with a 5-10 year written screening schedule and personal prevention plan. Recommendations were developed using the USPSTF age appropriate recommendations. Education, counseling, and referrals were provided as needed.  After Visit Summary printed and given to patient which includes a list of additional screenings\tests needed.      Follow up in about 3 months (around 7/24/2023).

## 2023-04-24 NOTE — PATIENT INSTRUCTIONS
Past due for screening with rectal mass removed GI June 2022, no follow up due to transportation issues. Patient agreed to see Dr Blakely for further evaluation and colonoscopy but also to evaluate skin lesion to distal nose. Refuses smoking cessation. But due also for lung cancer screening. Needs mammogram and consider Gyn referral if diflucan not helpful. Due for DEXA screening but patient refuses since insurance reported not covered with care.

## 2023-04-24 NOTE — ASSESSMENT & PLAN NOTE
Need mammogram, history of rectal mass with [facility] in 2019. Were planning to have repeat coloconcopy with Dr Roberts in 2020, 6 months later but lost since moved. Too difficult to get out of town appointments. Will refer to Dr Blakely.

## 2023-04-24 NOTE — PROGRESS NOTES
"Subjective:       Patient ID: Natalia Weller is a 72 y.o. female.    Chief Complaint: Medicare AWV (Pt here for medicare wellness - she also thinks she has a yeast infect (dude to smell and itch) no other problems or concerns today. )        Review of Systems      Objective:      Vitals:    04/24/23 0933   BP: 124/74   Pulse: 101   Resp: 20   Temp: 97 °F (36.1 °C)   SpO2: 98%   Weight: 84.8 kg (187 lb)   Height: 5' 1" (1.549 m)       Physical Exam    Assessment/Plan:     1. UTI symptoms  -     POCT urine dipstick without microscope    2. Vaginal candidiasis  Assessment & Plan:  Started 3 weeks ago with increased itching and starting to become irritated.       3. Medicare annual wellness visit, subsequent  Assessment & Plan:  Need mammogram, history of rectal mass with [facility] in 2019. Were planning to have repeat coloconcopy with Dr Roberts in 2020, 6 months later but lost since moved. Too difficult to get out of town appointments. Will refer to Dr Blakely.       4. History of non anemic vitamin B12 deficiency  Assessment & Plan:  No blood in stool, none in urine. Tried B12 and not working, increased fatigue. B12 injection in past helped. Will notify with lab when available.       5. Pulmonary emphysema, unspecified emphysema type  Assessment & Plan:  Restart using inhale, bevespi two puffs twice daily. Wean from smoking 3 cigarettes daily.          No follow-ups on file.          Discussed the diagnosis, prognosis, plan of care, chronic nature of and indications for, risks and benefits of treatment for conditions.  Continue all medications as prescribed unless otherwise noted.   Call if patient develops other symptoms or if not better in 2-3 days and sooner if worse. Emphasized the importance of compliance with all recommendations, including medication use and timely follow up. Instructed to return as noted be or sooner if patient develops any other problems or if there are any other additional " questions or concerns.

## 2023-04-24 NOTE — ASSESSMENT & PLAN NOTE
No blood in stool, none in urine. Tried B12 and not working, increased fatigue. B12 injection in past helped. Will notify with lab when available.

## 2023-04-24 NOTE — ASSESSMENT & PLAN NOTE
Refer to Dr Blakely for further evaluation and possible biopsy nasal skin lesion. Patient reports scaling with intermittent crusting intermittently for past 3 years or more. No past treatment.

## 2023-04-25 ENCOUNTER — TELEPHONE (OUTPATIENT)
Dept: FAMILY MEDICINE | Facility: CLINIC | Age: 73
End: 2023-04-25
Payer: COMMERCIAL

## 2023-04-25 NOTE — TELEPHONE ENCOUNTER
----- Message from Jaclyn Casiano DNP sent at 4/24/2023  5:49 PM CDT -----  Also notify patient iron saturation slightly low, but iron good at 57, no anemia, ferritin sufficient, folate good, and b12 normal at 554, no b12 deficiency at this time. With increased fatigue may need to consider returning to cardiologist for additional evaluation.

## 2023-05-01 ENCOUNTER — TELEPHONE (OUTPATIENT)
Dept: FAMILY MEDICINE | Facility: CLINIC | Age: 73
End: 2023-05-01
Payer: COMMERCIAL

## 2023-05-01 NOTE — TELEPHONE ENCOUNTER
----- Message from Ihsan Knight MA sent at 5/1/2023  7:49 AM CDT -----  Pt called and said that she thinks the new blood pressure medicine is way too strong- she said shes never known any one to be on that high mg of medicine and it made her sick. She wanted to speak to a nurse about this

## 2023-05-01 NOTE — TELEPHONE ENCOUNTER
Wanting to know diltiazem is causing her to not focus, walking, shaking and blurry vision. Started med on Saturday and stopped on Sunday. States that when she started it on Saturday bp was really low. Since stopping she hasn't been checking it but feels better.

## 2023-05-05 ENCOUNTER — HOSPITAL ENCOUNTER (OUTPATIENT)
Dept: RADIOLOGY | Facility: HOSPITAL | Age: 73
Discharge: HOME | End: 2023-05-05
Attending: NURSE PRACTITIONER
Payer: COMMERCIAL

## 2023-05-05 VITALS — HEIGHT: 61 IN | WEIGHT: 180 LBS | BODY MASS INDEX: 33.99 KG/M2

## 2023-05-05 DIAGNOSIS — Z00.00 MEDICARE ANNUAL WELLNESS VISIT, SUBSEQUENT: ICD-10-CM

## 2023-05-05 DIAGNOSIS — F17.210 SMOKING GREATER THAN 30 PACK YEARS: ICD-10-CM

## 2023-05-05 DIAGNOSIS — Z12.11 COLON CANCER SCREENING: ICD-10-CM

## 2023-05-05 PROCEDURE — 25500020 PHARM REV CODE 255: Performed by: NURSE PRACTITIONER

## 2023-05-05 PROCEDURE — A9698 NON-RAD CONTRAST MATERIALNOC: HCPCS | Performed by: NURSE PRACTITIONER

## 2023-05-05 PROCEDURE — 71271 CT THORAX LUNG CANCER SCR C-: CPT | Mod: TC

## 2023-05-05 PROCEDURE — 74177 CT ABD & PELVIS W/CONTRAST: CPT | Mod: TC

## 2023-05-05 PROCEDURE — 77067 SCR MAMMO BI INCL CAD: CPT | Mod: TC

## 2023-05-05 RX ADMIN — IOPAMIDOL 100 ML: 755 INJECTION, SOLUTION INTRAVENOUS at 08:05

## 2023-05-05 RX ADMIN — BARIUM SULFATE 900 ML: 20 SUSPENSION ORAL at 06:05

## 2023-05-09 NOTE — PROGRESS NOTES
Notify emphasema changes without masses or enlarged lymph nodes, moderate CAD plaque to aorta, strongly recommend no further smoking. Continue treatment as discussed. Notify any changes. Send copy to cardiology and recheck ct lung in 2 years for stability.

## 2023-05-10 ENCOUNTER — TELEPHONE (OUTPATIENT)
Dept: FAMILY MEDICINE | Facility: CLINIC | Age: 73
End: 2023-05-10
Payer: COMMERCIAL

## 2023-05-10 DIAGNOSIS — N76.0 BACTERIAL VAGINOSIS: Primary | ICD-10-CM

## 2023-05-10 DIAGNOSIS — B96.89 BACTERIAL VAGINOSIS: Primary | ICD-10-CM

## 2023-05-10 RX ORDER — METRONIDAZOLE 500 MG/1
500 TABLET ORAL EVERY 12 HOURS
COMMUNITY
End: 2023-05-10 | Stop reason: SDUPTHER

## 2023-05-10 RX ORDER — METRONIDAZOLE 500 MG/1
500 TABLET ORAL EVERY 12 HOURS
Qty: 14 TABLET | Refills: 0 | Status: SHIPPED | OUTPATIENT
Start: 2023-05-10 | End: 2023-05-17

## 2023-05-10 NOTE — TELEPHONE ENCOUNTER
Per Jaclyn-for odor and itching to bottom-flagyl 500mg BID for 7 days. Patient notified and medication sent to [address]

## 2023-05-10 NOTE — TELEPHONE ENCOUNTER
----- Message from Jaclyn Casiano DNP sent at 5/9/2023  6:03 PM CDT -----  Normal mammogram, reschedule in one year for breast exam and re screening mammogram unless changes

## 2023-05-10 NOTE — TELEPHONE ENCOUNTER
----- Message from Jaclyn Casiano DNP sent at 5/9/2023  6:03 PM CDT -----  Notify emphasema changes without masses or enlarged lymph nodes, moderate CAD plaque to aorta, strongly recommend no further smoking. Continue treatment as discussed. Notify any changes. Send copy to cardiology and recheck ct lung in 2 years for stability.

## 2023-05-10 NOTE — TELEPHONE ENCOUNTER
Pt states that at last visit(4/24/23), she was given diflucan(2 tabs) for odor and itching. She states symptoms are still there. Can you call in something else?

## 2023-06-27 ENCOUNTER — TELEPHONE (OUTPATIENT)
Dept: FAMILY MEDICINE | Facility: CLINIC | Age: 73
End: 2023-06-27
Payer: COMMERCIAL

## 2023-06-27 NOTE — TELEPHONE ENCOUNTER
Spoke with pt. She was given diflucan(2 tabs) on 4/24/23 for vaginal candidiasis. Then given flagyl(x7dys) on 5/10/23 for same symptoms. Pt states that symptoms seemed to be getting better, but now are back like they were in the beginning.     I advised pt that I will speak to Jaclyn and contact her again after morning clinic is done.

## 2023-06-27 NOTE — TELEPHONE ENCOUNTER
----- Message from Edilma Brooks sent at 6/27/2023 10:05 AM CDT -----  Regarding: Call back      She wants to talk to Jaclyn or [facility] nurse about her medication.      Call back at [phone]

## 2023-06-29 ENCOUNTER — OFFICE VISIT (OUTPATIENT)
Dept: FAMILY MEDICINE | Facility: CLINIC | Age: 73
End: 2023-06-29
Payer: COMMERCIAL

## 2023-06-29 VITALS
RESPIRATION RATE: 18 BRPM | DIASTOLIC BLOOD PRESSURE: 60 MMHG | HEIGHT: 61 IN | WEIGHT: 187 LBS | TEMPERATURE: 98 F | HEART RATE: 52 BPM | SYSTOLIC BLOOD PRESSURE: 106 MMHG | OXYGEN SATURATION: 95 % | BODY MASS INDEX: 35.3 KG/M2

## 2023-06-29 DIAGNOSIS — N95.2 ATROPHIC VAGINITIS: ICD-10-CM

## 2023-06-29 DIAGNOSIS — N89.8 VAGINAL DISCHARGE: Primary | ICD-10-CM

## 2023-06-29 PROCEDURE — 3008F PR BODY MASS INDEX (BMI) DOCUMENTED: ICD-10-PCS | Mod: ,,, | Performed by: NURSE PRACTITIONER

## 2023-06-29 PROCEDURE — 3074F PR MOST RECENT SYSTOLIC BLOOD PRESSURE < 130 MM HG: ICD-10-PCS | Mod: ,,, | Performed by: NURSE PRACTITIONER

## 2023-06-29 PROCEDURE — 1126F AMNT PAIN NOTED NONE PRSNT: CPT | Mod: ,,, | Performed by: NURSE PRACTITIONER

## 2023-06-29 PROCEDURE — 3078F PR MOST RECENT DIASTOLIC BLOOD PRESSURE < 80 MM HG: ICD-10-PCS | Mod: ,,, | Performed by: NURSE PRACTITIONER

## 2023-06-29 PROCEDURE — 99213 PR OFFICE/OUTPT VISIT, EST, LEVL III, 20-29 MIN: ICD-10-PCS | Mod: ,,, | Performed by: NURSE PRACTITIONER

## 2023-06-29 PROCEDURE — 3008F BODY MASS INDEX DOCD: CPT | Mod: ,,, | Performed by: NURSE PRACTITIONER

## 2023-06-29 PROCEDURE — 3288F PR FALLS RISK ASSESSMENT DOCUMENTED: ICD-10-PCS | Mod: ,,, | Performed by: NURSE PRACTITIONER

## 2023-06-29 PROCEDURE — 3078F DIAST BP <80 MM HG: CPT | Mod: ,,, | Performed by: NURSE PRACTITIONER

## 2023-06-29 PROCEDURE — 1159F MED LIST DOCD IN RCRD: CPT | Mod: ,,, | Performed by: NURSE PRACTITIONER

## 2023-06-29 PROCEDURE — 1101F PT FALLS ASSESS-DOCD LE1/YR: CPT | Mod: ,,, | Performed by: NURSE PRACTITIONER

## 2023-06-29 PROCEDURE — 1101F PR PT FALLS ASSESS DOC 0-1 FALLS W/OUT INJ PAST YR: ICD-10-PCS | Mod: ,,, | Performed by: NURSE PRACTITIONER

## 2023-06-29 PROCEDURE — 87205 SMEAR GRAM STAIN: CPT | Performed by: NURSE PRACTITIONER

## 2023-06-29 PROCEDURE — 99213 OFFICE O/P EST LOW 20 MIN: CPT | Mod: ,,, | Performed by: NURSE PRACTITIONER

## 2023-06-29 PROCEDURE — 3288F FALL RISK ASSESSMENT DOCD: CPT | Mod: ,,, | Performed by: NURSE PRACTITIONER

## 2023-06-29 PROCEDURE — 3074F SYST BP LT 130 MM HG: CPT | Mod: ,,, | Performed by: NURSE PRACTITIONER

## 2023-06-29 PROCEDURE — 1126F PR PAIN SEVERITY QUANTIFIED, NO PAIN PRESENT: ICD-10-PCS | Mod: ,,, | Performed by: NURSE PRACTITIONER

## 2023-06-29 PROCEDURE — 1159F PR MEDICATION LIST DOCUMENTED IN MEDICAL RECORD: ICD-10-PCS | Mod: ,,, | Performed by: NURSE PRACTITIONER

## 2023-06-29 NOTE — PROGRESS NOTES
"SUBJECTIVE:  Natalia Weller is a 72 y.o. female here for vaginal itching w/discharge (In April was given diflucan, in May was given flagyl, been taking OTC Azo yeast relief. Symptoms keep returning. Also foul odor.)      JESUS  Presents to the clinic with c/o recurrent vaginal itching and discharge.  She has treated for yeast, BV and most recently yeast.  She has a foul odor with a watery discharge and some itching.  She is not sexually active.    [place] allergies, medications, history, and problem list were updated as appropriate.    Review of Systems   Genitourinary:  Positive for vaginal discharge.    A comprehensive review of symptoms was completed and negative except as noted above.    No results found for this or any previous visit (from the past 504 hour(s)).    OBJECTIVE:  Vital signs  Vitals:    06/29/23 0849   BP: 106/60   BP Location: Right arm   Patient Position: Sitting   Pulse: (!) 52   Resp: 18   Temp: 97.5 °F (36.4 °C)   TempSrc: Temporal   SpO2: 95%   Weight: 84.8 kg (187 lb)   Height: 5' 1" (1.549 m)        Physical Exam  Exam conducted with a chaperone present.   Constitutional:       Appearance: Normal appearance. She is obese.   HENT:      Head: Normocephalic and atraumatic.      Nose: Nose normal.      Mouth/Throat:      Mouth: Mucous membranes are moist.      Pharynx: Oropharynx is clear.   Eyes:      Extraocular Movements: Extraocular movements intact.      Conjunctiva/sclera: Conjunctivae normal.      Pupils: Pupils are equal, round, and reactive to light.   Cardiovascular:      Rate and Rhythm: Normal rate and regular rhythm.   Pulmonary:      Effort: Pulmonary effort is normal.      Breath sounds: Normal breath sounds.   Abdominal:      General: Bowel sounds are normal.      Palpations: Abdomen is soft.   Genitourinary:     Comments: Atropic vaginal tissue with some redness and mild discharge which is watery in consistency and has a mild odor.  Swab obtained for lab " analysis.  Musculoskeletal:         General: Normal range of motion.      Cervical back: Normal range of motion and neck supple.   Skin:     General: Skin is warm.      Capillary Refill: Capillary refill takes less than 2 seconds.   Neurological:      Mental Status: She is alert.   Psychiatric:         Mood and Affect: Mood normal.         Behavior: Behavior normal.         Judgment: Judgment normal.        ASSESSMENT/PLAN:  1. Vaginal discharge  Comments:  discussed with pateint and she agrees to wait for lab results to identify discharge characteritics before treatment  Orders:  -     [facility] GENERIC ORDERABLE GSBV (BV)  -     [facility] GENERIC ORDERABLE SVAG (yeast)    2. Atrophic vaginitis        Follow Up:  Follow up if symptoms worsen or fail to improve.             117

## 2023-07-01 LAB — MAYO GENERIC ORDERABLE RESULT: ABNORMAL

## 2023-07-03 ENCOUNTER — TELEPHONE (OUTPATIENT)
Dept: FAMILY MEDICINE | Facility: CLINIC | Age: 73
End: 2023-07-03
Payer: COMMERCIAL

## 2023-07-03 DIAGNOSIS — B96.89 BACTERIAL VAGINOSIS: Primary | ICD-10-CM

## 2023-07-03 DIAGNOSIS — N76.0 BACTERIAL VAGINOSIS: Primary | ICD-10-CM

## 2023-07-03 RX ORDER — METRONIDAZOLE 7.5 MG/G
1 GEL VAGINAL 2 TIMES DAILY
Qty: 70 G | Refills: 0 | Status: SHIPPED | OUTPATIENT
Start: 2023-07-03 | End: 2023-07-08

## 2023-07-03 NOTE — TELEPHONE ENCOUNTER
----- Message from Cesilia Brooks NP sent at 7/3/2023  2:46 PM CDT -----  Please notify patient of results.  I sent out Metrogel that she is to use twice daily for 5 days

## 2023-07-24 PROBLEM — Z00.00 MEDICARE ANNUAL WELLNESS VISIT, SUBSEQUENT: Status: RESOLVED | Noted: 2023-04-24 | Resolved: 2023-07-24

## 2023-07-26 ENCOUNTER — OFFICE VISIT (OUTPATIENT)
Dept: FAMILY MEDICINE | Facility: CLINIC | Age: 73
End: 2023-07-26
Payer: COMMERCIAL

## 2023-07-26 VITALS
SYSTOLIC BLOOD PRESSURE: 110 MMHG | WEIGHT: 187 LBS | BODY MASS INDEX: 35.3 KG/M2 | RESPIRATION RATE: 20 BRPM | OXYGEN SATURATION: 97 % | HEART RATE: 113 BPM | TEMPERATURE: 97 F | DIASTOLIC BLOOD PRESSURE: 68 MMHG | HEIGHT: 61 IN

## 2023-07-26 DIAGNOSIS — N39.0 RECURRENT UTI: ICD-10-CM

## 2023-07-26 DIAGNOSIS — M94.9 DISORDER OF BONE AND ARTICULAR CARTILAGE: ICD-10-CM

## 2023-07-26 DIAGNOSIS — E78.2 MIXED HYPERLIPIDEMIA: ICD-10-CM

## 2023-07-26 DIAGNOSIS — M89.9 DISORDER OF BONE AND ARTICULAR CARTILAGE: ICD-10-CM

## 2023-07-26 DIAGNOSIS — R73.01 IMPAIRED FASTING BLOOD SUGAR: ICD-10-CM

## 2023-07-26 DIAGNOSIS — F41.9 ANXIETY AND DEPRESSION: ICD-10-CM

## 2023-07-26 DIAGNOSIS — Z12.11 SCREENING FOR COLON CANCER: ICD-10-CM

## 2023-07-26 DIAGNOSIS — Z72.0 TOBACCO ABUSE DISORDER: ICD-10-CM

## 2023-07-26 DIAGNOSIS — J43.9 PULMONARY EMPHYSEMA, UNSPECIFIED EMPHYSEMA TYPE: Primary | ICD-10-CM

## 2023-07-26 DIAGNOSIS — B96.89 BV (BACTERIAL VAGINOSIS): ICD-10-CM

## 2023-07-26 DIAGNOSIS — Z11.59 NEED FOR HEPATITIS C SCREENING TEST: ICD-10-CM

## 2023-07-26 DIAGNOSIS — K21.9 GASTROESOPHAGEAL REFLUX DISEASE WITHOUT ESOPHAGITIS: ICD-10-CM

## 2023-07-26 DIAGNOSIS — Z85.038 PERSONAL HISTORY OF COLON CANCER: ICD-10-CM

## 2023-07-26 DIAGNOSIS — N76.0 BV (BACTERIAL VAGINOSIS): ICD-10-CM

## 2023-07-26 DIAGNOSIS — E28.39 DECREASED ESTROGEN LEVEL: ICD-10-CM

## 2023-07-26 DIAGNOSIS — F32.A ANXIETY AND DEPRESSION: ICD-10-CM

## 2023-07-26 LAB
BILIRUB SERPL-MCNC: NORMAL MG/DL
BLOOD URINE, POC: NORMAL
CLARITY, POC UA: CLEAR
COLOR, POC UA: YELLOW
GLUCOSE UR QL STRIP: NORMAL
KETONES UR QL STRIP: NORMAL
LEUKOCYTE ESTERASE URINE, POC: NORMAL
NITRITE, POC UA: NORMAL
PH, POC UA: 5.5
PROTEIN, POC: NORMAL
SPECIFIC GRAVITY, POC UA: 1.01
UROBILINOGEN, POC UA: 0.2

## 2023-07-26 PROCEDURE — 1159F MED LIST DOCD IN RCRD: CPT | Mod: ,,, | Performed by: NURSE PRACTITIONER

## 2023-07-26 PROCEDURE — 1100F PR PT FALLS ASSESS DOC 2+ FALLS/FALL W/INJURY/YR: ICD-10-PCS | Mod: ,,, | Performed by: NURSE PRACTITIONER

## 2023-07-26 PROCEDURE — 1100F PTFALLS ASSESS-DOCD GE2>/YR: CPT | Mod: ,,, | Performed by: NURSE PRACTITIONER

## 2023-07-26 PROCEDURE — 81002 URINALYSIS NONAUTO W/O SCOPE: CPT | Mod: RHCUB | Performed by: NURSE PRACTITIONER

## 2023-07-26 PROCEDURE — 3074F PR MOST RECENT SYSTOLIC BLOOD PRESSURE < 130 MM HG: ICD-10-PCS | Mod: ,,, | Performed by: NURSE PRACTITIONER

## 2023-07-26 PROCEDURE — 3008F BODY MASS INDEX DOCD: CPT | Mod: ,,, | Performed by: NURSE PRACTITIONER

## 2023-07-26 PROCEDURE — 99214 OFFICE O/P EST MOD 30 MIN: CPT | Mod: ,,, | Performed by: NURSE PRACTITIONER

## 2023-07-26 PROCEDURE — 99214 PR OFFICE/OUTPT VISIT, EST, LEVL IV, 30-39 MIN: ICD-10-PCS | Mod: ,,, | Performed by: NURSE PRACTITIONER

## 2023-07-26 PROCEDURE — 1160F PR REVIEW ALL MEDS BY PRESCRIBER/CLIN PHARMACIST DOCUMENTED: ICD-10-PCS | Mod: ,,, | Performed by: NURSE PRACTITIONER

## 2023-07-26 PROCEDURE — 3288F FALL RISK ASSESSMENT DOCD: CPT | Mod: ,,, | Performed by: NURSE PRACTITIONER

## 2023-07-26 PROCEDURE — 3008F PR BODY MASS INDEX (BMI) DOCUMENTED: ICD-10-PCS | Mod: ,,, | Performed by: NURSE PRACTITIONER

## 2023-07-26 PROCEDURE — 3288F PR FALLS RISK ASSESSMENT DOCUMENTED: ICD-10-PCS | Mod: ,,, | Performed by: NURSE PRACTITIONER

## 2023-07-26 PROCEDURE — 3078F PR MOST RECENT DIASTOLIC BLOOD PRESSURE < 80 MM HG: ICD-10-PCS | Mod: ,,, | Performed by: NURSE PRACTITIONER

## 2023-07-26 PROCEDURE — 3078F DIAST BP <80 MM HG: CPT | Mod: ,,, | Performed by: NURSE PRACTITIONER

## 2023-07-26 PROCEDURE — 1160F RVW MEDS BY RX/DR IN RCRD: CPT | Mod: ,,, | Performed by: NURSE PRACTITIONER

## 2023-07-26 PROCEDURE — 3044F PR MOST RECENT HEMOGLOBIN A1C LEVEL <7.0%: ICD-10-PCS | Mod: ,,, | Performed by: NURSE PRACTITIONER

## 2023-07-26 PROCEDURE — 3044F HG A1C LEVEL LT 7.0%: CPT | Mod: ,,, | Performed by: NURSE PRACTITIONER

## 2023-07-26 PROCEDURE — 1159F PR MEDICATION LIST DOCUMENTED IN MEDICAL RECORD: ICD-10-PCS | Mod: ,,, | Performed by: NURSE PRACTITIONER

## 2023-07-26 PROCEDURE — 3074F SYST BP LT 130 MM HG: CPT | Mod: ,,, | Performed by: NURSE PRACTITIONER

## 2023-07-26 RX ORDER — NITROFURANTOIN 25; 75 MG/1; MG/1
100 CAPSULE ORAL 2 TIMES DAILY
Qty: 14 CAPSULE | Refills: 0 | Status: SHIPPED | OUTPATIENT
Start: 2023-07-26 | End: 2023-08-22

## 2023-07-26 RX ORDER — METRONIDAZOLE 500 MG/1
500 TABLET ORAL 3 TIMES DAILY
Qty: 21 TABLET | Refills: 0 | Status: SHIPPED | OUTPATIENT
Start: 2023-07-26 | End: 2023-08-22

## 2023-07-26 RX ORDER — OMEPRAZOLE 20 MG/1
20 CAPSULE, DELAYED RELEASE ORAL DAILY
Qty: 90 CAPSULE | Refills: 1 | Status: SHIPPED | OUTPATIENT
Start: 2023-07-26 | End: 2023-08-22

## 2023-07-26 NOTE — ASSESSMENT & PLAN NOTE
She did feel the symptoms had improved temporarily while taken the Metrogel but is out will treat with Flagyl this time but will get a referral to the urologist to be able to evaluate and check pelvic exam with severe tenderness extends distally at the anterior vaginal wall no prolapse noted.

## 2023-07-26 NOTE — PROGRESS NOTES
"Subjective:       Patient ID: Natalia Weller is a 73 y.o. female.    Chief Complaint: Follow-up (From [facility]. States that she continues with foul odor. Also f/u on meds. Working well. Needing refills on prilosec. )    Patient had a recent bacterial vaginosis treat with Metrogel.  Today she returns for follow-up with chronic conditions but does not need fasting lab for her cholesterol and low vitamin-D she also has hypertension and a history of emphysema.  We have tried to obtain a prior authorization for bone density on at least 3 separate occasions in the past without success.  She feels that she is still experiencing the dysuria with the urine but also the foul vaginal odor.  The symptoms improved temporarily when ever she was with Metrogel but returned since then.  She has not had any sexual contact anything to stimulate a infection for at least 14 years.  She does have a past history of renal cancer and colon cancer.  She has a history of colon cancer 2019 and was seeing Dr. Cuellar home but needs a referral to see a provider to ensure doing well.    Review of Systems    See HPI  Objective:      Vitals:    07/26/23 0732   BP: 110/68   Pulse: (!) 113   Resp: 20   Temp: 96.7 °F (35.9 °C)   SpO2: 97%   Weight: 84.8 kg (187 lb)   Height: 5' 1" (1.549 m)       Physical Exam    Awake alert and she cooperative.  Skin warm and dry mucous membranes slightly dry but pink.  Can not upper respiratory wheeze cleared with cough.  Respirations regular and nonlabored.  Cardiac regular rate and rhythm without murmur no lower extremity edema.  Abdomen soft nontender with slight suprapubic tenderness.  Vaginally dryness with fish odor but no discharge noted.  Unable to catheterize without severe discomfort and stopped when patient requested.  Slight urethral off increased erythema noted no discharge.  Assessment/Plan:     1. Pulmonary emphysema, unspecified emphysema type    2. Anxiety and depression  Assessment & Plan:  Patient is " currently taking Wellbutrin  mg daily.  Since increasing the Wellbutrin from 150-300 mg she does feel that it is working better and desires to stay on this current medication the depression has improved.      3. Gastroesophageal reflux disease without esophagitis  Assessment & Plan:  Patient currently has Prilosec 20 mg prescribed.  It does work when she takes but has been out for a few days and would like more for if needed    Orders:  -     omeprazole (PRILOSEC) 20 MG capsule; Take 1 capsule (20 mg total) by mouth once daily.  Dispense: 90 capsule; Refill: 1    4. Mixed hyperlipidemia  Assessment & Plan:  Will notify with results of lab draw when available. Continue current treatment until results available.         5. Impaired fasting blood sugar  Assessment & Plan:  Will notify with results of lab draw when available. Continue current treatment until results available.         6. Disorder of bone and articular cartilage  -     DXA Bone Density Axial Skeleton 1 or more sites; Future; Expected date: 07/26/2023    7. Need for hepatitis C screening test  -     Hepatitis C antibody; Future; Expected date: 07/26/2023    8. Tobacco abuse disorder  Assessment & Plan:  The patient had taking medication help quit smoking but reached a point that she was not progressing and stop taking it and she went from 3 cigarettes a day up to half pack 10 cigarettes a day again.  She does though want to quit and does have the medication and will try again.      9. Recurrent UTI  Assessment & Plan:  She has not at felt that the urine ever cleared completely after taking the Metrogel she still found in the fell over but she is also now experiencing dysuria before urinating.  She is also experiencing the foul smell as she describes it after taking the medicine it had improved for a time but then it returned.    Orders:  -     nitrofurantoin, macrocrystal-monohydrate, (MACROBID) 100 MG capsule; Take 1 capsule (100 mg total) by mouth  2 (two) times daily. for 7 days  Dispense: 14 capsule; Refill: 0  -     POCT urine dipstick without microscope    10. BV (bacterial vaginosis)  Assessment & Plan:  She did feel the symptoms had improved temporarily while taken the Metrogel but is out will treat with Flagyl this time but will get a referral to the urologist to be able to evaluate and check pelvic exam with severe tenderness extends distally at the anterior vaginal wall no prolapse noted.    Orders:  -     metroNIDAZOLE (FLAGYL) 500 MG tablet; Take 1 tablet (500 mg total) by mouth 3 (three) times daily.  Dispense: 21 tablet; Refill: 0    11. Personal history of colon cancer  Overview:  Two thousand nineteen colon cancer discovered with a colon resection.  She is due for a colonoscopy recheck in October.  Request Dr. Reyes Edwards.  Discussed possibility of setting with an oncologist again since it has not been 5 years since her last evaluation with oncologist.         No follow-ups on file.          Discussed the diagnosis, prognosis, plan of care, chronic nature of and indications for, risks and benefits of treatment for conditions.  Continue all medications as prescribed unless otherwise noted.   Call if patient develops other symptoms or if not better in 2-3 days and sooner if worse. Emphasized the importance of compliance with all recommendations, including medication use and timely follow up. Instructed to return as noted be or sooner if patient develops any other problems or if there are any other additional questions or concerns.

## 2023-07-26 NOTE — ASSESSMENT & PLAN NOTE
She has not at felt that the urine ever cleared completely after taking the Metrogel she still found in the fell over but she is also now experiencing dysuria before urinating.  She is also experiencing the foul smell as she describes it after taking the medicine it had improved for a time but then it returned.

## 2023-07-26 NOTE — ASSESSMENT & PLAN NOTE
Patient currently has Prilosec 20 mg prescribed.  It does work when she takes but has been out for a few days and would like more for if needed

## 2023-07-26 NOTE — ASSESSMENT & PLAN NOTE
Patient is currently taking Wellbutrin  mg daily.  Since increasing the Wellbutrin from 150-300 mg she does feel that it is working better and desires to stay on this current medication the depression has improved.

## 2023-07-26 NOTE — ASSESSMENT & PLAN NOTE
The patient had taking medication help quit smoking but reached a point that she was not progressing and stop taking it and she went from 3 cigarettes a day up to half pack 10 cigarettes a day again.  She does though want to quit and does have the medication and will try again.

## 2023-07-29 LAB — BACTERIA UR CULT: ABNORMAL

## 2023-08-01 ENCOUNTER — TELEPHONE (OUTPATIENT)
Dept: FAMILY MEDICINE | Facility: CLINIC | Age: 73
End: 2023-08-01
Payer: COMMERCIAL

## 2023-08-01 NOTE — TELEPHONE ENCOUNTER
----- Message from Jaclyn Casiano DNP sent at 7/31/2023  6:26 PM CDT -----  Notify less than 10,000 E coli colonies unless she is experiencing symptoms this could be contamination.  Since no symptoms continue with increased water intake avoid sodas and good hygiene following urination

## 2023-08-14 ENCOUNTER — LAB VISIT (OUTPATIENT)
Dept: FAMILY MEDICINE | Facility: CLINIC | Age: 73
End: 2023-08-14
Payer: COMMERCIAL

## 2023-08-14 DIAGNOSIS — E78.5 HYPERLIPIDEMIA, UNSPECIFIED HYPERLIPIDEMIA TYPE: ICD-10-CM

## 2023-08-14 DIAGNOSIS — Z11.59 ENCOUNTER FOR HCV SCREENING TEST FOR LOW RISK PATIENT: ICD-10-CM

## 2023-08-14 DIAGNOSIS — Z85.038 PERSONAL HISTORY OF COLON CANCER: ICD-10-CM

## 2023-08-14 DIAGNOSIS — N39.0 RECURRENT UTI: ICD-10-CM

## 2023-08-14 DIAGNOSIS — R73.01 IMPAIRED FASTING BLOOD SUGAR: ICD-10-CM

## 2023-08-14 DIAGNOSIS — Z79.899 MEDICATION MANAGEMENT: Primary | ICD-10-CM

## 2023-08-14 LAB
ALBUMIN SERPL-MCNC: 4.5 G/DL (ref 3.4–5)
ALBUMIN/GLOB SERPL: 1.4 RATIO
ALP SERPL-CCNC: 69 UNIT/L (ref 50–144)
ALT SERPL-CCNC: 20 UNIT/L (ref 1–45)
ANION GAP SERPL CALC-SCNC: 9 MEQ/L (ref 2–13)
AST SERPL-CCNC: 24 UNIT/L (ref 14–36)
BASOPHILS # BLD AUTO: 0.06 X10(3)/MCL (ref 0.01–0.08)
BASOPHILS NFR BLD AUTO: 1.3 % (ref 0.1–1.2)
BILIRUB SERPL-MCNC: 0.8 MG/DL (ref 0–1)
BILIRUB SERPL-MCNC: NEGATIVE MG/DL
BLOOD URINE, POC: NEGATIVE
BUN SERPL-MCNC: 12 MG/DL (ref 7–20)
CALCIUM SERPL-MCNC: 10.1 MG/DL (ref 8.4–10.2)
CEA SERPL-MCNC: <1.73 NG/ML (ref 0–3)
CHLORIDE SERPL-SCNC: 100 MMOL/L (ref 98–110)
CLARITY, POC UA: NORMAL
CO2 SERPL-SCNC: 26 MMOL/L (ref 21–32)
COLOR, POC UA: YELLOW
CREAT SERPL-MCNC: 0.88 MG/DL (ref 0.66–1.25)
CREAT/UREA NIT SERPL: 14 (ref 12–20)
DEPRECATED CALCIDIOL+CALCIFEROL SERPL-MC: <20 NG/ML (ref 30–100)
EOSINOPHIL # BLD AUTO: 0.18 X10(3)/MCL (ref 0.04–0.36)
EOSINOPHIL NFR BLD AUTO: 3.9 % (ref 0.7–7)
ERYTHROCYTE [DISTWIDTH] IN BLOOD BY AUTOMATED COUNT: 13.9 % (ref 11–14.5)
EST. AVERAGE GLUCOSE BLD GHB EST-MCNC: 114 MG/DL (ref 70–115)
GFR SERPLBLD CREATININE-BSD FMLA CKD-EPI: 70 MLS/MIN/1.73/M2
GLOBULIN SER-MCNC: 3.3 GM/DL (ref 2–3.9)
GLUCOSE SERPL-MCNC: 95 MG/DL (ref 70–115)
GLUCOSE UR QL STRIP: NEGATIVE
HBA1C MFR BLD: 5.6 % (ref 4–6)
HCT VFR BLD AUTO: 42.7 % (ref 36–48)
HGB BLD-MCNC: 14.3 G/DL (ref 11.8–16)
IMM GRANULOCYTES # BLD AUTO: 0.01 X10(3)/MCL (ref 0–0.03)
IMM GRANULOCYTES NFR BLD AUTO: 0.2 % (ref 0–0.5)
KETONES UR QL STRIP: NEGATIVE
LEUKOCYTE ESTERASE URINE, POC: NORMAL
LYMPHOCYTES # BLD AUTO: 1.99 X10(3)/MCL (ref 1.16–3.74)
LYMPHOCYTES NFR BLD AUTO: 43.6 % (ref 20–55)
MCH RBC QN AUTO: 29.2 PG (ref 27–34)
MCHC RBC AUTO-ENTMCNC: 33.5 G/DL (ref 31–37)
MCV RBC AUTO: 87.3 FL (ref 79–99)
MONOCYTES # BLD AUTO: 0.33 X10(3)/MCL (ref 0.24–0.36)
MONOCYTES NFR BLD AUTO: 7.2 % (ref 4.7–12.5)
NEUTROPHILS # BLD AUTO: 1.99 X10(3)/MCL (ref 1.56–6.13)
NEUTROPHILS NFR BLD AUTO: 43.8 % (ref 37–73)
NITRITE, POC UA: NEGATIVE
NRBC BLD AUTO-RTO: 0 %
PH, POC UA: 6
PLATELET # BLD AUTO: 323 X10(3)/MCL (ref 140–371)
PMV BLD AUTO: 11.9 FL (ref 9.4–12.4)
POTASSIUM SERPL-SCNC: 5.2 MMOL/L (ref 3.5–5.1)
PROT SERPL-MCNC: 7.8 GM/DL (ref 6.3–8.2)
PROTEIN, POC: NEGATIVE
RBC # BLD AUTO: 4.89 X10(6)/MCL (ref 4–5.1)
SODIUM SERPL-SCNC: 135 MMOL/L (ref 135–145)
SPECIFIC GRAVITY, POC UA: 1.01
UROBILINOGEN, POC UA: NORMAL
WBC # SPEC AUTO: 4.56 X10(3)/MCL (ref 4–11.5)

## 2023-08-14 PROCEDURE — 81002 URINALYSIS NONAUTO W/O SCOPE: CPT | Mod: RHCUB | Performed by: NURSE PRACTITIONER

## 2023-08-15 ENCOUNTER — TELEPHONE (OUTPATIENT)
Dept: FAMILY MEDICINE | Facility: CLINIC | Age: 73
End: 2023-08-15
Payer: COMMERCIAL

## 2023-08-15 DIAGNOSIS — E55.9 VITAMIN D DEFICIENCY: Primary | ICD-10-CM

## 2023-08-15 LAB — MAYO GENERIC ORDERABLE RESULT: NORMAL

## 2023-08-15 RX ORDER — ERGOCALCIFEROL 1.25 MG/1
50000 CAPSULE ORAL
Qty: 12 CAPSULE | Refills: 0 | Status: SHIPPED | OUTPATIENT
Start: 2023-08-15 | End: 2023-08-22

## 2023-08-15 NOTE — TELEPHONE ENCOUNTER
Pt notified. Pt states she can not make it to the oncologist in [place] and would like one closer.

## 2023-08-15 NOTE — TELEPHONE ENCOUNTER
----- Message from Jaclyn Casiano DNP sent at 8/15/2023  6:25 AM CDT -----  Notify CBC normal, CEA <1.73, send copy to oncologist and ask if they have past baseline for comparison. Low vitamin-D, increase vitamin D 50,000 weekly with 1000 daily, potassium still upper limits, not on potassium sparing medications, low potassium diet, increase water intake, recheck cmp, flp, vitamin D in 3 months

## 2023-08-16 ENCOUNTER — TELEPHONE (OUTPATIENT)
Dept: FAMILY MEDICINE | Facility: CLINIC | Age: 73
End: 2023-08-16
Payer: COMMERCIAL

## 2023-08-16 ENCOUNTER — TELEPHONE (OUTPATIENT)
Dept: HEMATOLOGY/ONCOLOGY | Facility: CLINIC | Age: 73
End: 2023-08-16
Payer: COMMERCIAL

## 2023-08-16 DIAGNOSIS — Z85.038 PERSONAL HISTORY OF COLON CANCER: Primary | ICD-10-CM

## 2023-08-16 LAB — BACTERIA UR CULT: NORMAL

## 2023-08-16 NOTE — TELEPHONE ENCOUNTER
Spoke to the patient regarding referral. Patient unable to make it to [place]. Will discuss referral with providers. Will also need records. TTRN

## 2023-08-16 NOTE — TELEPHONE ENCOUNTER
----- Message from Casi Jurado LPN sent at 8/15/2023 11:28 AM CDT -----  Pt notified. She said she can't make it to the oncologist in [place] and would like one closer.      ----- Message -----  From: Jaclyn Casiano, DORIS  Sent: 8/15/2023   6:25 AM CDT  To: [facility] Staff    Notify CBC normal, CEA <1.73, send copy to oncologist and ask if they have past baseline for comparison. Low vitamin-D, increase vitamin D 50,000 weekly with 1000 daily, potassium still upper limits, not on potassium sparing medications, low potassium diet, increase water intake, recheck cmp, flp, vitamin D in 3 months

## 2023-08-22 ENCOUNTER — OFFICE VISIT (OUTPATIENT)
Dept: FAMILY MEDICINE | Facility: CLINIC | Age: 73
End: 2023-08-22
Payer: COMMERCIAL

## 2023-08-22 VITALS
DIASTOLIC BLOOD PRESSURE: 68 MMHG | OXYGEN SATURATION: 99 % | TEMPERATURE: 98 F | HEIGHT: 61 IN | WEIGHT: 185 LBS | BODY MASS INDEX: 34.93 KG/M2 | HEART RATE: 99 BPM | RESPIRATION RATE: 20 BRPM | SYSTOLIC BLOOD PRESSURE: 121 MMHG

## 2023-08-22 DIAGNOSIS — Z71.89 ADVANCE CARE PLANNING: ICD-10-CM

## 2023-08-22 DIAGNOSIS — Z85.038 PERSONAL HISTORY OF COLON CANCER: ICD-10-CM

## 2023-08-22 DIAGNOSIS — C64.2 RENAL CANCER, LEFT: ICD-10-CM

## 2023-08-22 DIAGNOSIS — R82.90 BAD ODOR OF URINE: Primary | ICD-10-CM

## 2023-08-22 DIAGNOSIS — E78.2 MIXED HYPERLIPIDEMIA: ICD-10-CM

## 2023-08-22 DIAGNOSIS — E55.9 VITAMIN D DEFICIENCY: ICD-10-CM

## 2023-08-22 PROBLEM — N76.0 BV (BACTERIAL VAGINOSIS): Status: RESOLVED | Noted: 2023-07-26 | Resolved: 2023-08-22

## 2023-08-22 PROBLEM — B96.89 BV (BACTERIAL VAGINOSIS): Status: RESOLVED | Noted: 2023-07-26 | Resolved: 2023-08-22

## 2023-08-22 LAB
BILIRUB SERPL-MCNC: NEGATIVE MG/DL
BLOOD URINE, POC: NEGATIVE
CLARITY, POC UA: CLEAR
COLOR, POC UA: YELLOW
GLUCOSE UR QL STRIP: NEGATIVE
KETONES UR QL STRIP: NEGATIVE
LEUKOCYTE ESTERASE URINE, POC: NEGATIVE
NITRITE, POC UA: NEGATIVE
PH, POC UA: 7
PROTEIN, POC: NEGATIVE
SPECIFIC GRAVITY, POC UA: 1.01
UROBILINOGEN, POC UA: NORMAL

## 2023-08-22 PROCEDURE — 99215 OFFICE O/P EST HI 40 MIN: CPT | Mod: ,,, | Performed by: NURSE PRACTITIONER

## 2023-08-22 PROCEDURE — 3074F PR MOST RECENT SYSTOLIC BLOOD PRESSURE < 130 MM HG: ICD-10-PCS | Mod: ,,, | Performed by: NURSE PRACTITIONER

## 2023-08-22 PROCEDURE — 3078F PR MOST RECENT DIASTOLIC BLOOD PRESSURE < 80 MM HG: ICD-10-PCS | Mod: ,,, | Performed by: NURSE PRACTITIONER

## 2023-08-22 PROCEDURE — 3288F PR FALLS RISK ASSESSMENT DOCUMENTED: ICD-10-PCS | Mod: ,,, | Performed by: NURSE PRACTITIONER

## 2023-08-22 PROCEDURE — 3044F PR MOST RECENT HEMOGLOBIN A1C LEVEL <7.0%: ICD-10-PCS | Mod: ,,, | Performed by: NURSE PRACTITIONER

## 2023-08-22 PROCEDURE — 1100F PTFALLS ASSESS-DOCD GE2>/YR: CPT | Mod: ,,, | Performed by: NURSE PRACTITIONER

## 2023-08-22 PROCEDURE — 3078F DIAST BP <80 MM HG: CPT | Mod: ,,, | Performed by: NURSE PRACTITIONER

## 2023-08-22 PROCEDURE — 3288F FALL RISK ASSESSMENT DOCD: CPT | Mod: ,,, | Performed by: NURSE PRACTITIONER

## 2023-08-22 PROCEDURE — 1159F PR MEDICATION LIST DOCUMENTED IN MEDICAL RECORD: ICD-10-PCS | Mod: ,,, | Performed by: NURSE PRACTITIONER

## 2023-08-22 PROCEDURE — 3044F HG A1C LEVEL LT 7.0%: CPT | Mod: ,,, | Performed by: NURSE PRACTITIONER

## 2023-08-22 PROCEDURE — 3008F BODY MASS INDEX DOCD: CPT | Mod: ,,, | Performed by: NURSE PRACTITIONER

## 2023-08-22 PROCEDURE — 99215 PR OFFICE/OUTPT VISIT, EST, LEVL V, 40-54 MIN: ICD-10-PCS | Mod: ,,, | Performed by: NURSE PRACTITIONER

## 2023-08-22 PROCEDURE — 1160F PR REVIEW ALL MEDS BY PRESCRIBER/CLIN PHARMACIST DOCUMENTED: ICD-10-PCS | Mod: ,,, | Performed by: NURSE PRACTITIONER

## 2023-08-22 PROCEDURE — 3074F SYST BP LT 130 MM HG: CPT | Mod: ,,, | Performed by: NURSE PRACTITIONER

## 2023-08-22 PROCEDURE — 99417 PR PROLONGED SVC, OUTPT, W/WO DIRECT PT CONTACT,  EA ADDTL 15 MIN: ICD-10-PCS | Mod: ,,, | Performed by: NURSE PRACTITIONER

## 2023-08-22 PROCEDURE — 1159F MED LIST DOCD IN RCRD: CPT | Mod: ,,, | Performed by: NURSE PRACTITIONER

## 2023-08-22 PROCEDURE — 81002 URINALYSIS NONAUTO W/O SCOPE: CPT | Mod: RHCUB | Performed by: NURSE PRACTITIONER

## 2023-08-22 PROCEDURE — 3008F PR BODY MASS INDEX (BMI) DOCUMENTED: ICD-10-PCS | Mod: ,,, | Performed by: NURSE PRACTITIONER

## 2023-08-22 PROCEDURE — 1100F PR PT FALLS ASSESS DOC 2+ FALLS/FALL W/INJURY/YR: ICD-10-PCS | Mod: ,,, | Performed by: NURSE PRACTITIONER

## 2023-08-22 PROCEDURE — 99417 PROLNG OP E/M EACH 15 MIN: CPT | Mod: ,,, | Performed by: NURSE PRACTITIONER

## 2023-08-22 PROCEDURE — 1160F RVW MEDS BY RX/DR IN RCRD: CPT | Mod: ,,, | Performed by: NURSE PRACTITIONER

## 2023-08-22 NOTE — ASSESSMENT & PLAN NOTE
In 2011 she was diagnosed with stage IV left kidney cancer and had it removed in has been with the oncologist but has since changed that.  At this time she has an appointment with the oncologist that does try to come to [place] but she is determined that she does not want any further maintenance or any additional evaluation or treatment of this at this time.  Discussed advance directive and has given paperwork to be able to help

## 2023-08-22 NOTE — ASSESSMENT & PLAN NOTE
Patient agreed to continue taking Lipitor 40 in his not experiencing side effects with it agreed to taking the Lipitor since she has right carotid stenosis and does not want it to worsen.  Agreed to return in October 2 recheck lab to verify that it is therapeutic

## 2023-08-22 NOTE — ASSESSMENT & PLAN NOTE
Patient understands that her vitamin-D is very low discussed the purposes of this including bone health in assistance with immunity.  She understand but does not want to treated at this time.

## 2023-08-22 NOTE — ASSESSMENT & PLAN NOTE
The patient was awake alert and appropriately answer questions.  She reported in 2011 she was diagnosed with stage IV left kidney cancer and she had colon cancer with a resection in 2019.  Her urine is not a concern to her at this time she has completed all of the medications.  At this point she requests no further additional care to include urology referral to evaluate if there is any problem or treatment.  Oncology to be able to provide surveillance and safety of past colon cancer.  She is willing and would like to have a screening colonoscopy in October to ensure that it is still safe but does not want any additional care.  A long discussion with explanation and discussion advance care planning and power of  medically was discussed and provided with instructional material to return.  Answered all questions.  Patient at this time only agrees to take Lipitor due to a blockage but refuses any other medications or treatment at this time.

## 2023-08-22 NOTE — PROGRESS NOTES
Subjective:       Patient ID: Natalia Weller is a 73 y.o. female.    Chief Complaint: Follow-up (1 mth f/u on uti. Has appt with Prem Gentile in 2 days for recurrent UTI. Pt states that she isn't going due to no transportation. The lady she spoke with states that they are working on getting Dr. Yoder in [place]. Pt is due for dexa scan)    The patient returns for follow-up with recurrent urinary tract infections hypertension emphysema and getting established with oncologist.   She was scheduled to see Prem Gentile in [place] Thursday for recurrent UTI.  She is been treated and now is not experiencing any burning or any symptoms but does still have a smell that does smell different.  Two thousand eleven was diagnosed with left kidney cancer but at this point does not want to do any further evaluation or treatment of cancer either of the kidney or of the colon.  She is willing to have a follow-up colonoscopy with Dr. Addy Cobb in October but wants no other maintenance.  She is just returned from the cardiologist with a good report and is continuing on her cholesterol medicine but does not taking any other medicines including those for blood pressure.  She feels that she is feels no different on the medicine and understands that it could increase risk for heart attack or stroke if she does not manage it but she does not care.      Review of Systems   Constitutional:  Negative for activity change, appetite change, chills, fatigue, fever and unexpected weight change.   HENT:  Negative for nasal congestion, dental problem, hearing loss, nosebleeds, trouble swallowing and voice change.    Eyes: Negative.  Negative for discharge, redness and visual disturbance.   Respiratory: Negative.  Negative for cough, chest tightness, shortness of breath and wheezing.    Cardiovascular:  Negative for chest pain, palpitations and leg swelling.   Gastrointestinal:  Negative for abdominal distention, blood in stool, change in  "bowel habit, nausea, vomiting and change in bowel habit.   Endocrine: Negative for cold intolerance, heat intolerance, polydipsia and polyuria.   Genitourinary:  Negative for bladder incontinence, difficulty urinating, dysuria, flank pain, frequency, hematuria and urgency.   Musculoskeletal:  Negative for arthralgias, gait problem and joint deformity.   Allergic/Immunologic: Negative for environmental allergies and food allergies.   Neurological:  Negative for vertigo, tremors, seizures, syncope, weakness, light-headedness, numbness, headaches, coordination difficulties, memory loss and coordination difficulties.   Hematological:  Negative for adenopathy. Does not bruise/bleed easily.   Psychiatric/Behavioral:  Negative for agitation, confusion, self-injury, sleep disturbance and suicidal ideas.    All other systems reviewed and are negative.        Objective:      Vitals:    08/22/23 0728   BP: 121/68   Pulse: 99   Resp: 20   Temp: 98.1 °F (36.7 °C)   SpO2: 99%   Weight: 83.9 kg (185 lb)   Height: 5' 1" (1.549 m)       Physical Exam  Vitals and nursing note reviewed.   Constitutional:       General: She is not in acute distress.     Appearance: She is not ill-appearing.   HENT:      Head: Normocephalic and atraumatic.      Mouth/Throat:      Mouth: Mucous membranes are moist.      Pharynx: Oropharynx is clear.   Eyes:      General: No scleral icterus.     Extraocular Movements: Extraocular movements intact.      Conjunctiva/sclera: Conjunctivae normal.      Pupils: Pupils are equal, round, and reactive to light.   Neck:      Vascular: No carotid bruit.   Cardiovascular:      Rate and Rhythm: Normal rate and regular rhythm.      Pulses: Normal pulses.      Heart sounds: Normal heart sounds. No murmur heard.     No friction rub. No gallop.   Pulmonary:      Effort: Pulmonary effort is normal. No respiratory distress.      Breath sounds: Normal breath sounds. No wheezing, rhonchi or rales.   Abdominal:      General: " Abdomen is flat. Bowel sounds are normal. There is no distension.      Palpations: Abdomen is soft. There is no mass.      Tenderness: There is no abdominal tenderness.   Musculoskeletal:         General: Normal range of motion.      Cervical back: Normal range of motion and neck supple.   Skin:     General: Skin is warm and dry.   Neurological:      General: No focal deficit present.      Mental Status: She is alert.   Psychiatric:         Mood and Affect: Mood normal.         Assessment/Plan:     1. Bad odor of urine  -     POCT urine dipstick without microscope    2. Personal history of colon cancer  Overview:  Two thousand nineteen colon cancer discovered with a colon resection.  She is due for a colonoscopy recheck in October.  Request Dr. Reyes Edwards.  Discussed possibility of setting with an oncologist again since it has not been 5 years since her last evaluation with oncologist.    Assessment & Plan:  Due for colonoscopy in October history colon resection for colon cancer with Dr Addy Cobb for repeat colonscopy.  But does not want to do any other screening..      3. Renal cancer, left  Assessment & Plan:  In 2011 she was diagnosed with stage IV left kidney cancer and had it removed in has been with the oncologist but has since changed that.  At this time she has an appointment with the oncologist that does try to come to [facility] but she is determined that she does not want any further maintenance or any additional evaluation or treatment of this at this time.  Discussed advance directive and has given paperwork to be able to help      4. Advance care planning  Assessment & Plan:  The patient was awake alert and appropriately answer questions.  She reported in 2011 she was diagnosed with stage IV left kidney cancer and she had colon cancer with a resection in 2019.  Her urine is not a concern to her at this time she has completed all of the medications.  At this point she requests no further additional  care to include urology referral to evaluate if there is any problem or treatment.  Oncology to be able to provide surveillance and safety of past colon cancer.  She is willing and would like to have a screening colonoscopy in October to ensure that it is still safe but does not want any additional care.  A long discussion with explanation and discussion advance care planning and power of  medically was discussed and provided with instructional material to return.  Answered all questions.  Patient at this time only agrees to take Lipitor due to a blockage but refuses any other medications or treatment at this time.      5. Mixed hyperlipidemia  Assessment & Plan:  Patient agreed to continue taking Lipitor 40 in his not experiencing side effects with it agreed to taking the Lipitor since she has right carotid stenosis and does not want it to worsen.  Agreed to return in October 2 recheck lab to verify that it is therapeutic      6. Vitamin D deficiency  Assessment & Plan:  Patient understands that her vitamin-D is very low discussed the purposes of this including bone health in assistance with immunity.  She understand but does not want to treated at this time.         Follow up in about 2 months (around 10/22/2023).          Discussed the diagnosis, prognosis, plan of care, chronic nature of and indications for, risks and benefits of treatment for conditions.  Continue all medications as prescribed unless otherwise noted.   Call if patient develops other symptoms or if not better in 2-3 days and sooner if worse. Emphasized the importance of compliance with all recommendations, including medication use and timely follow up. Instructed to return as noted be or sooner if patient develops any other problems or if there are any other additional questions or concerns.

## 2023-08-22 NOTE — ASSESSMENT & PLAN NOTE
Due for colonoscopy in October history colon resection for colon cancer with Dr Addy Cobb for repeat colonscopy.  But does not want to do any other screening..

## 2023-10-30 PROBLEM — N39.0 RECURRENT UTI: Status: RESOLVED | Noted: 2023-07-26 | Resolved: 2023-10-30

## 2024-03-21 PROBLEM — I65.23 BILATERAL CAROTID ARTERY STENOSIS: Status: ACTIVE | Noted: 2024-03-21

## 2024-03-21 PROBLEM — Z91.148 NON COMPLIANCE W MEDICATION REGIMEN: Status: ACTIVE | Noted: 2024-03-21

## 2024-03-25 ENCOUNTER — OFFICE VISIT (OUTPATIENT)
Dept: FAMILY MEDICINE | Facility: CLINIC | Age: 74
End: 2024-03-25
Payer: COMMERCIAL

## 2024-03-25 VITALS
HEART RATE: 111 BPM | BODY MASS INDEX: 33.79 KG/M2 | TEMPERATURE: 97 F | HEIGHT: 61 IN | DIASTOLIC BLOOD PRESSURE: 81 MMHG | SYSTOLIC BLOOD PRESSURE: 132 MMHG | RESPIRATION RATE: 20 BRPM | WEIGHT: 179 LBS | OXYGEN SATURATION: 97 %

## 2024-03-25 DIAGNOSIS — Z91.148 NON COMPLIANCE W MEDICATION REGIMEN: ICD-10-CM

## 2024-03-25 DIAGNOSIS — Z86.39 HISTORY OF NON ANEMIC VITAMIN B12 DEFICIENCY: ICD-10-CM

## 2024-03-25 DIAGNOSIS — E28.39 DECREASED ESTROGEN LEVEL: ICD-10-CM

## 2024-03-25 DIAGNOSIS — E55.9 VITAMIN D DEFICIENCY: ICD-10-CM

## 2024-03-25 DIAGNOSIS — I65.23 BILATERAL CAROTID ARTERY STENOSIS: ICD-10-CM

## 2024-03-25 DIAGNOSIS — Z72.0 TOBACCO ABUSE DISORDER: ICD-10-CM

## 2024-03-25 DIAGNOSIS — R53.83 FATIGUE, UNSPECIFIED TYPE: ICD-10-CM

## 2024-03-25 DIAGNOSIS — E78.2 MIXED HYPERLIPIDEMIA: ICD-10-CM

## 2024-03-25 DIAGNOSIS — K21.9 GASTROESOPHAGEAL REFLUX DISEASE WITHOUT ESOPHAGITIS: ICD-10-CM

## 2024-03-25 DIAGNOSIS — Z71.89 ENCOUNTER FOR COUNSELING FOR CARE MANAGEMENT OF PATIENT WITH CHRONIC CONDITIONS AND COMPLEX HEALTH NEEDS USING NURSE-BASED MODEL: Primary | ICD-10-CM

## 2024-03-25 DIAGNOSIS — R73.01 IMPAIRED FASTING BLOOD SUGAR: ICD-10-CM

## 2024-03-25 DIAGNOSIS — Z12.31 ENCOUNTER FOR SCREENING MAMMOGRAM FOR MALIGNANT NEOPLASM OF BREAST: ICD-10-CM

## 2024-03-25 DIAGNOSIS — Z71.89 ADVANCE CARE PLANNING: ICD-10-CM

## 2024-03-25 DIAGNOSIS — Z23 FLU VACCINE NEED: ICD-10-CM

## 2024-03-25 DIAGNOSIS — J43.9 PULMONARY EMPHYSEMA, UNSPECIFIED EMPHYSEMA TYPE: ICD-10-CM

## 2024-03-25 DIAGNOSIS — L98.9 NON-HEALING SKIN LESION OF NOSE: ICD-10-CM

## 2024-03-25 LAB
ALBUMIN SERPL-MCNC: 4.7 G/DL (ref 3.4–5)
ALBUMIN/GLOB SERPL: 1.5 RATIO
ALP SERPL-CCNC: 63 UNIT/L (ref 50–144)
ALT SERPL-CCNC: 18 UNIT/L (ref 1–45)
ANION GAP SERPL CALC-SCNC: 8 MEQ/L (ref 2–13)
AST SERPL-CCNC: 25 UNIT/L (ref 14–36)
BASOPHILS # BLD AUTO: 0.02 X10(3)/MCL (ref 0.01–0.08)
BASOPHILS NFR BLD AUTO: 0.4 % (ref 0.1–1.2)
BILIRUB SERPL-MCNC: 0.6 MG/DL (ref 0–1)
BUN SERPL-MCNC: 21 MG/DL (ref 7–20)
CALCIUM SERPL-MCNC: 10.3 MG/DL (ref 8.4–10.2)
CHLORIDE SERPL-SCNC: 105 MMOL/L (ref 98–110)
CHOLEST SERPL-MCNC: 164 MG/DL (ref 0–200)
CO2 SERPL-SCNC: 26 MMOL/L (ref 21–32)
CREAT SERPL-MCNC: 0.85 MG/DL (ref 0.66–1.25)
CREAT/UREA NIT SERPL: 25 (ref 12–20)
EOSINOPHIL # BLD AUTO: 0.18 X10(3)/MCL (ref 0.04–0.36)
EOSINOPHIL NFR BLD AUTO: 3.3 % (ref 0.7–7)
ERYTHROCYTE [DISTWIDTH] IN BLOOD BY AUTOMATED COUNT: 13.4 % (ref 11–14.5)
EST. AVERAGE GLUCOSE BLD GHB EST-MCNC: 111.2 MG/DL (ref 70–115)
FERRITIN SERPL-MCNC: 22.7 NG/ML (ref 6.24–264)
GFR SERPLBLD CREATININE-BSD FMLA CKD-EPI: 73 MLS/MIN/1.73/M2
GLOBULIN SER-MCNC: 3.2 GM/DL (ref 2–3.9)
GLUCOSE SERPL-MCNC: 103 MG/DL (ref 70–115)
HBA1C MFR BLD: 5.5 % (ref 4–6)
HCT VFR BLD AUTO: 43.4 % (ref 36–48)
HDLC SERPL-MCNC: 50 MG/DL (ref 40–60)
HGB BLD-MCNC: 14.8 G/DL (ref 11.8–16)
IMM GRANULOCYTES # BLD AUTO: 0.01 X10(3)/MCL (ref 0–0.03)
IMM GRANULOCYTES NFR BLD AUTO: 0.2 % (ref 0–0.5)
IRON SERPL-MCNC: 120 UG/DL (ref 37–170)
LDLC SERPL DIRECT ASSAY-SCNC: 85.3 MG/DL (ref 30–100)
LYMPHOCYTES # BLD AUTO: 2.29 X10(3)/MCL (ref 1.16–3.74)
LYMPHOCYTES NFR BLD AUTO: 41.9 % (ref 20–55)
MCH RBC QN AUTO: 30.5 PG (ref 27–34)
MCHC RBC AUTO-ENTMCNC: 34.1 G/DL (ref 31–37)
MCV RBC AUTO: 89.5 FL (ref 79–99)
MONOCYTES # BLD AUTO: 0.32 X10(3)/MCL (ref 0.24–0.36)
MONOCYTES NFR BLD AUTO: 5.9 % (ref 4.7–12.5)
NEUTROPHILS # BLD AUTO: 2.64 X10(3)/MCL (ref 1.56–6.13)
NEUTROPHILS NFR BLD AUTO: 48.3 % (ref 37–73)
NRBC BLD AUTO-RTO: 0 %
PLATELET # BLD AUTO: 261 X10(3)/MCL (ref 140–371)
PMV BLD AUTO: 12.5 FL (ref 9.4–12.4)
POTASSIUM SERPL-SCNC: 5.4 MMOL/L (ref 3.5–5.1)
PROT SERPL-MCNC: 7.9 GM/DL (ref 6.3–8.2)
RBC # BLD AUTO: 4.85 X10(6)/MCL (ref 4–5.1)
SODIUM SERPL-SCNC: 139 MMOL/L (ref 135–145)
TRIGL SERPL-MCNC: 147 MG/DL (ref 30–200)
TSH SERPL-ACNC: 2.08 UIU/ML (ref 0.36–3.74)
WBC # SPEC AUTO: 5.46 X10(3)/MCL (ref 4–11.5)

## 2024-03-25 PROCEDURE — 85025 COMPLETE CBC W/AUTO DIFF WBC: CPT | Performed by: NURSE PRACTITIONER

## 2024-03-25 PROCEDURE — 90694 VACC AIIV4 NO PRSRV 0.5ML IM: CPT | Mod: ,,, | Performed by: NURSE PRACTITIONER

## 2024-03-25 PROCEDURE — 82306 VITAMIN D 25 HYDROXY: CPT | Performed by: NURSE PRACTITIONER

## 2024-03-25 PROCEDURE — 1160F RVW MEDS BY RX/DR IN RCRD: CPT | Mod: ,,, | Performed by: NURSE PRACTITIONER

## 2024-03-25 PROCEDURE — 3075F SYST BP GE 130 - 139MM HG: CPT | Mod: ,,, | Performed by: NURSE PRACTITIONER

## 2024-03-25 PROCEDURE — 99214 OFFICE O/P EST MOD 30 MIN: CPT | Mod: ,,, | Performed by: NURSE PRACTITIONER

## 2024-03-25 PROCEDURE — 80061 LIPID PANEL: CPT | Performed by: NURSE PRACTITIONER

## 2024-03-25 PROCEDURE — 82728 ASSAY OF FERRITIN: CPT | Performed by: NURSE PRACTITIONER

## 2024-03-25 PROCEDURE — 83036 HEMOGLOBIN GLYCOSYLATED A1C: CPT | Performed by: NURSE PRACTITIONER

## 2024-03-25 PROCEDURE — 83540 ASSAY OF IRON: CPT | Performed by: NURSE PRACTITIONER

## 2024-03-25 PROCEDURE — 84443 ASSAY THYROID STIM HORMONE: CPT | Performed by: NURSE PRACTITIONER

## 2024-03-25 PROCEDURE — 3079F DIAST BP 80-89 MM HG: CPT | Mod: ,,, | Performed by: NURSE PRACTITIONER

## 2024-03-25 PROCEDURE — 80053 COMPREHEN METABOLIC PANEL: CPT | Performed by: NURSE PRACTITIONER

## 2024-03-25 PROCEDURE — 3008F BODY MASS INDEX DOCD: CPT | Mod: ,,, | Performed by: NURSE PRACTITIONER

## 2024-03-25 PROCEDURE — 1158F ADVNC CARE PLAN TLK DOCD: CPT | Mod: ,,, | Performed by: NURSE PRACTITIONER

## 2024-03-25 PROCEDURE — G0008 ADMIN INFLUENZA VIRUS VAC: HCPCS | Mod: ,,, | Performed by: NURSE PRACTITIONER

## 2024-03-25 PROCEDURE — 1159F MED LIST DOCD IN RCRD: CPT | Mod: ,,, | Performed by: NURSE PRACTITIONER

## 2024-03-25 RX ORDER — ATORVASTATIN CALCIUM 20 MG/1
20 TABLET, FILM COATED ORAL NIGHTLY
COMMUNITY
Start: 2024-03-18

## 2024-03-25 NOTE — ASSESSMENT & PLAN NOTE
The patient has past history of impaired fasting glucose recommend low concentrated carbohydrate diet with weight loss.  Check hemoglobin A1c and notify with results when available

## 2024-03-25 NOTE — ASSESSMENT & PLAN NOTE
We will check CBC vitamin B12 ferritin and iron. Will notify with results of lab draw when available. Continue current treatment until results available.

## 2024-03-25 NOTE — ASSESSMENT & PLAN NOTE
Has been off Lipitor 20 mg for proximally six-month prior to returning to cardiologist recently. Will notify with results of lab draw when available. Continue current treatment until results available.

## 2024-03-25 NOTE — ASSESSMENT & PLAN NOTE
The patient was on inhalers for COPD in management but has currently been off of all medications approximately 6 months.  Strongly recommend chest x-ray with pulmonary function study pre and post bronchodilation to evaluate current status. No inhalers and declines all other treatment at this time.

## 2024-03-25 NOTE — ASSESSMENT & PLAN NOTE
Strongly advised tobacco cessation restart aspirin 81 mg with Lipitor 20 mg she was unable to tolerate the 40 mg. All of your core healthy metrics are met. Insurance Care Management services with nurse to help manage these measures.

## 2024-03-25 NOTE — ASSESSMENT & PLAN NOTE
Dry red scaly patch to tip of nose with approximately 3.7 x 1.9 cm. Refer to ABURIE Alberto for evaluation and biopsy of growing skin lesion.

## 2024-03-25 NOTE — ASSESSMENT & PLAN NOTE
Advance Care Planning     Date: 03/25/2024    Living Will  During this visit, I engaged the patient  in the voluntary advance care planning process.  The patient and I reviewed the role for advance directives and their purpose in directing future healthcare if the patient's unable to speak for him/herself.  At this point in time, the patient does have full decision-making capacity.  We discussed different extreme health states that she could experience, and reviewed what kind of medical care she would want in those situations.  The patient communicated that if she were comatose and had little chance of a meaningful recovery, she would not want machines/life-sustaining treatments used. In addition to the above preference, other important end-of-life issues for the patient include  the patient does not desire additional testing or advanced care extending services.  . The patient has already designated a healthcare power of  to make decisions on her behalf.  I spent a total of 30 minutes engaging the patient in this advance care planning discussion.  Will bring copy of advanced directive.

## 2024-03-25 NOTE — ASSESSMENT & PLAN NOTE
It is very important that she quit smoking. There are various alternatives available to help with this difficult task, but first and foremost, she must make a firm commitment and decision to quit. The nature of nicotine addiction is discussed. The usefulness of behavioral therapy is discussed and suggested.  The correct use, cost and side effects of nicotine replacement therapy such as gum or patches is discussed. Bupropion and its cost (sometimes not covered fully by insurance) and side effects are reviewed. The quit rates are discussed. I recommend she not allow potential costs of treatment to deter her from using nicotine replacement therapy or bupropion, as the long term economic and health benefits are obvious. Counseling 4 minutes but declines any medication or additional counseling.

## 2024-03-25 NOTE — ASSESSMENT & PLAN NOTE
[facility] has seen multiple request to patient needing nurse managed coordination of chronic conditions but the patient has been interested in starting these with her last revision though she has a agreed on January 18, 2020 24 was seen by Criselda Caban for management COPD chest pain fall risk with repeated fall and educational assistance with COPD.  Also has been off of her medications for six-month prior to seeing her cardiologist has been advised to resume the medications and is here for follow-up with the adherence at this time.  Due respond to [facility] and strongly recommend nurse chronic care management services to continue for improved compliance.  Agreed agreed to give new telephone number to the nurse to allow just for follow-up with ensure taking the cholesterol medication and aspirin without any problems or heartburn also agreed to have the mammogram and bone density when they are due but does not want any COPD treatment at this time we will see skin biopsy in [place] make that referral not ready to quit smoking about a pack a day at this time does not want any extra medicines since she is finally feeling better and wants to keep going as she is.

## 2024-03-25 NOTE — ASSESSMENT & PLAN NOTE
Not taking any supplements. Will notify with results of lab draw when available. Continue current treatment until results available.

## 2024-03-25 NOTE — PROGRESS NOTES
Subjective:       Patient ID: Natalia Weller is a 73 y.o. female.    Chief Complaint: Follow-up (Pt here for follow up on chronic conditions - pt says she is only taking cholesterol medicine and a baby aspirin and has quit taking everything else and feels fine. No new problems or concerns. )    The patient returns for follow-up with chronic care management conditions including COPD chest pain hyperlipidemia carotid stenosis managed by cardiologist history of COPD with smoking personal history of colon and renal cancer.  She is willing to have her mammogram and bone density when they are due but does not want to do any screening for COPD she is able to dance 5 sewn without becoming short of breath or chest pain and does not want to use any medication still smoking a pack a day but not ready to quit or even think about help at this time.  Has agreed that it would be okay to speak with the continuing care manager by nurse through the telephone and it is all right to give her for phone number.  We will see David Alberto for biopsy of increasing size with skin lesion.      Review of Systems   Constitutional:  Negative for activity change and appetite change.   HENT:  Negative for nasal congestion, trouble swallowing and voice change.    Eyes: Negative.  Negative for visual disturbance.   Respiratory:  Positive for cough. Negative for chest tightness, shortness of breath and wheezing.    Cardiovascular:  Negative for chest pain, palpitations and leg swelling.   Gastrointestinal: Negative.    Endocrine: Negative for cold intolerance, heat intolerance and polyuria.   Genitourinary:  Negative for bladder incontinence, difficulty urinating, flank pain and frequency.   Musculoskeletal: Negative.    Integumentary:  Positive for mole/lesion.   Allergic/Immunologic: Negative for environmental allergies and food allergies.   Neurological:  Negative for vertigo, tremors, seizures, syncope, light-headedness, headaches and  "coordination difficulties.   Hematological:  Negative for adenopathy. Does not bruise/bleed easily.   Psychiatric/Behavioral:  Negative for agitation, sleep disturbance and suicidal ideas.          Objective:      Vitals:    03/25/24 0732   BP: 132/81   Pulse: (!) 111   Resp: 20   Temp: 97.4 °F (36.3 °C)   SpO2: 97%   Weight: 81.2 kg (179 lb)   Height: 5' 1" (1.549 m)       Physical Exam  Vitals and nursing note reviewed.   Constitutional:       General: She is not in acute distress.     Appearance: She is not ill-appearing.   HENT:      Head: Normocephalic and atraumatic.        Comments: Red scaly raised lesion to nose at 3.9 by 1.8 cm     Nose: Rhinorrhea present.      Mouth/Throat:      Mouth: Mucous membranes are moist.      Pharynx: Oropharynx is clear.   Eyes:      General: No scleral icterus.     Extraocular Movements: Extraocular movements intact.      Conjunctiva/sclera: Conjunctivae normal.      Pupils: Pupils are equal, round, and reactive to light.   Neck:      Vascular: No carotid bruit.   Cardiovascular:      Rate and Rhythm: Normal rate and regular rhythm.      Pulses: Normal pulses.      Heart sounds: Normal heart sounds. No murmur heard.     No friction rub. No gallop.   Pulmonary:      Effort: Pulmonary effort is normal. No respiratory distress.      Breath sounds: Normal breath sounds. No wheezing, rhonchi or rales.   Abdominal:      General: Abdomen is flat. Bowel sounds are normal. There is no distension.      Palpations: Abdomen is soft. There is no mass.      Tenderness: There is no abdominal tenderness.   Musculoskeletal:         General: Normal range of motion.      Cervical back: Normal range of motion and neck supple.   Skin:     General: Skin is warm and dry.   Neurological:      General: No focal deficit present.      Mental Status: She is alert.   Psychiatric:         Mood and Affect: Mood normal.         Assessment/Plan:     1. Encounter for counseling for care management of patient " with chronic conditions and complex health needs using nurse-based model  Assessment & Plan:  [facility] has seen multiple request to patient needing nurse managed coordination of chronic conditions but the patient has been interested in starting these with her last revision though she has a agreed on January 18, 2020 24 was seen by Criselda Caban for management COPD chest pain fall risk with repeated fall and educational assistance with COPD.  Also has been off of her medications for six-month prior to seeing her cardiologist has been advised to resume the medications and is here for follow-up with the adherence at this time.  Due respond to [facility] and strongly recommend nurse chronic care management services to continue for improved compliance.  Agreed agreed to give new telephone number to the nurse to allow just for follow-up with ensure taking the cholesterol medication and aspirin without any problems or heartburn also agreed to have the mammogram and bone density when they are due but does not want any COPD treatment at this time we will see skin biopsy in [place] make that referral not ready to quit smoking about a pack a day at this time does not want any extra medicines since she is finally feeling better and wants to keep going as she is.      2. Tobacco abuse disorder  Assessment & Plan:  It is very important that she quit smoking. There are various alternatives available to help with this difficult task, but first and foremost, she must make a firm commitment and decision to quit. The nature of nicotine addiction is discussed. The usefulness of behavioral therapy is discussed and suggested.  The correct use, cost and side effects of nicotine replacement therapy such as gum or patches is discussed. Bupropion and its cost (sometimes not covered fully by insurance) and side effects are reviewed. The quit rates are discussed. I recommend she not allow potential costs of treatment to deter her from  using nicotine replacement therapy or bupropion, as the long term economic and health benefits are obvious. Counseling 4 minutes but declines any medication or additional counseling.       3. Pulmonary emphysema, unspecified emphysema type  Assessment & Plan:  The patient was on inhalers for COPD in management but has currently been off of all medications approximately 6 months.  Strongly recommend chest x-ray with pulmonary function study pre and post bronchodilation to evaluate current status. No inhalers and declines all other treatment at this time.       4. Mixed hyperlipidemia  Assessment & Plan:  Has been off Lipitor 20 mg for proximally six-month prior to returning to cardiologist recently. Will notify with results of lab draw when available. Continue current treatment until results available.         5. Bilateral carotid artery stenosis  Overview:      Assessment & Plan:  Strongly advised tobacco cessation restart aspirin 81 mg with Lipitor 20 mg she was unable to tolerate the 40 mg. All of your core healthy metrics are met. Insurance Care Management services with nurse to help manage these measures.       6. History of non anemic vitamin B12 deficiency  Assessment & Plan:  We will check CBC vitamin B12 ferritin and iron. Will notify with results of lab draw when available. Continue current treatment until results available.         7. Impaired fasting blood sugar  Assessment & Plan:  The patient has past history of impaired fasting glucose recommend low concentrated carbohydrate diet with weight loss.  Check hemoglobin A1c and notify with results when available      8. Advance care planning  Assessment & Plan:  Advance Care Planning    Date: 03/25/2024    Living Will  During this visit, I engaged the patient  in the voluntary advance care planning process.  The patient and I reviewed the role for advance directives and their purpose in directing future healthcare if the patient's unable to speak for  him/herself.  At this point in time, the patient does have full decision-making capacity.  We discussed different extreme health states that she could experience, and reviewed what kind of medical care she would want in those situations.  The patient communicated that if she were comatose and had little chance of a meaningful recovery, she would not want machines/life-sustaining treatments used. In addition to the above preference, other important end-of-life issues for the patient include  the patient does not desire additional testing or advanced care extending services.  . The patient has already designated a healthcare power of  to make decisions on her behalf.  I spent a total of 30 minutes engaging the patient in this advance care planning discussion.  Will bring copy of advanced directive.                 9. Non-healing skin lesion of nose  Assessment & Plan:  Dry red scaly patch to tip of nose with approximately 3.7 x 1.9 cm. Refer to AUBRIE Alberto for evaluation and biopsy of growing skin lesion.        10. Vitamin D deficiency  Assessment & Plan:  Not taking any supplements. Will notify with results of lab draw when available. Continue current treatment until results available.         11. Gastroesophageal reflux disease without esophagitis  Assessment & Plan:  Changed diet and taking pepto bismol only rarely. Continue low fat low spice diet.       12. Non compliance w medication regimen  Overview:  Upon follow-up with cardiologist she has been off her medications for the past 6 months without known cause it to restart these for safety and prevention of further complications by .  Wanting to take any medications other than the cholesterol with a baby aspirin..      13. Decreased estrogen level  -     CT Bone Density Study 1 + Sites Axial; Future; Expected date: 03/26/2024    14. Encounter for screening mammogram for malignant neoplasm of breast  -     Mammo Digital Screening Bilat w/ Daron;  Future; Expected date: 03/26/2024       Follow up in about 6 months (around 9/25/2024).          Discussed the diagnosis, prognosis, plan of care, chronic nature of and indications for, risks and benefits of treatment for conditions.  Continue all medications as prescribed unless otherwise noted.   Call if patient develops other symptoms or if not better in 2-3 days and sooner if worse. Emphasized the importance of compliance with all recommendations, including medication use and timely follow up. Instructed to return as noted be or sooner if patient develops any other problems or if there are any other additional questions or concerns.

## 2024-03-26 ENCOUNTER — TELEPHONE (OUTPATIENT)
Dept: FAMILY MEDICINE | Facility: CLINIC | Age: 74
End: 2024-03-26
Payer: COMMERCIAL

## 2024-03-26 NOTE — TELEPHONE ENCOUNTER
----- Message from Jaclyn Casiano DNP sent at 3/25/2024  5:25 PM CDT -----  Notify potassium is gotten too high 5.4 also calcium too high could have something to do with not drinking enough water but kidney function is stable liver looking stable.  Provide low-potassium diet no anemia platelets problems.  Ferritin sufficient no sign of deficiency at this time cholesterol slightly elevated but just restarted on Lipitor 20 continue this and recheck CMP FLP and CBC in six-month

## 2024-03-27 LAB — DEPRECATED CALCIDIOL+CALCIFEROL SERPL-MC: 19.9 NG/ML (ref 30–80)

## 2024-03-28 ENCOUNTER — TELEPHONE (OUTPATIENT)
Dept: FAMILY MEDICINE | Facility: CLINIC | Age: 74
End: 2024-03-28
Payer: COMMERCIAL

## 2024-03-28 DIAGNOSIS — E55.9 VITAMIN D DEFICIENCY: Primary | ICD-10-CM

## 2024-03-28 RX ORDER — CHOLECALCIFEROL (VITAMIN D3) 25 MCG
1000 TABLET ORAL DAILY
Qty: 30 TABLET | Refills: 0 | Status: SHIPPED | OUTPATIENT
Start: 2024-03-28

## 2024-03-28 RX ORDER — ERGOCALCIFEROL 1.25 MG/1
50000 CAPSULE ORAL
Qty: 12 CAPSULE | Refills: 1 | Status: SHIPPED | OUTPATIENT
Start: 2024-03-28 | End: 2024-05-06 | Stop reason: SDUPTHER

## 2024-03-28 NOTE — TELEPHONE ENCOUNTER
----- Message from Jaclyn Casiano DNP sent at 3/28/2024 10:14 AM CDT -----  Notify vitamin-D low at 19.9 this does affect bone health and immunity.  Begin vitamin-D 50,000 weekly with 1000 over-the-counter daily and recheck vitamin-D in six-month

## 2024-04-15 DIAGNOSIS — E78.2 MIXED HYPERLIPIDEMIA: Primary | ICD-10-CM

## 2024-04-15 RX ORDER — ATORVASTATIN CALCIUM 40 MG/1
TABLET, FILM COATED ORAL
Qty: 90 TABLET | Refills: 1 | Status: SHIPPED | OUTPATIENT
Start: 2024-04-15

## 2024-05-06 ENCOUNTER — TELEPHONE (OUTPATIENT)
Dept: FAMILY MEDICINE | Facility: CLINIC | Age: 74
End: 2024-05-06
Payer: COMMERCIAL

## 2024-05-06 ENCOUNTER — HOSPITAL ENCOUNTER (OUTPATIENT)
Dept: RADIOLOGY | Facility: HOSPITAL | Age: 74
Discharge: HOME | End: 2024-05-06
Attending: NURSE PRACTITIONER
Payer: COMMERCIAL

## 2024-05-06 DIAGNOSIS — Z12.31 ENCOUNTER FOR SCREENING MAMMOGRAM FOR MALIGNANT NEOPLASM OF BREAST: ICD-10-CM

## 2024-05-06 DIAGNOSIS — E28.39 DECREASED ESTROGEN LEVEL: ICD-10-CM

## 2024-05-06 DIAGNOSIS — M81.0 OSTEOPOROSIS, UNSPECIFIED OSTEOPOROSIS TYPE, UNSPECIFIED PATHOLOGICAL FRACTURE PRESENCE: Primary | ICD-10-CM

## 2024-05-06 DIAGNOSIS — E55.9 VITAMIN D DEFICIENCY: ICD-10-CM

## 2024-05-06 PROCEDURE — 77063 BREAST TOMOSYNTHESIS BI: CPT | Mod: TC

## 2024-05-06 PROCEDURE — 77078 CT BONE DENSITY AXIAL: CPT | Mod: TC

## 2024-05-06 RX ORDER — ALENDRONATE SODIUM 70 MG/1
70 TABLET ORAL
COMMUNITY
End: 2024-05-06 | Stop reason: SDUPTHER

## 2024-05-06 RX ORDER — ERGOCALCIFEROL 1.25 MG/1
50000 CAPSULE ORAL
Qty: 12 CAPSULE | Refills: 1 | Status: SHIPPED | OUTPATIENT
Start: 2024-05-06

## 2024-05-06 RX ORDER — ALENDRONATE SODIUM 70 MG/1
70 TABLET ORAL
Qty: 4 TABLET | Refills: 2 | Status: SHIPPED | OUTPATIENT
Start: 2024-05-06 | End: 2024-08-04

## 2024-05-06 NOTE — TELEPHONE ENCOUNTER
----- Message from Jaclyn Casiano DNP sent at 5/6/2024 12:13 PM CDT -----  Notify patient mild osteoporosis lumbar -2.74 and hip -2.89 with fracture risk. Continue vitamin D and weight bearing exercise but add fosamax 70 mg once weekly on empty stomach, remain upright for 2 hours after taking medications. Return for medication tolerance in one month. Recheck dexa in 2 years

## 2024-05-13 ENCOUNTER — TELEPHONE (OUTPATIENT)
Dept: FAMILY MEDICINE | Facility: CLINIC | Age: 74
End: 2024-05-13

## 2024-05-13 NOTE — TELEPHONE ENCOUNTER
----- Message from Jaclyn Casiano DNP sent at 5/13/2024  6:46 AM CDT -----  Normal mammogram, reschedule in one year for breast exam and re screening mammogram unless changes

## 2024-08-08 ENCOUNTER — OFFICE VISIT (OUTPATIENT)
Dept: FAMILY MEDICINE | Facility: CLINIC | Age: 74
End: 2024-08-08
Payer: COMMERCIAL

## 2024-08-08 VITALS
SYSTOLIC BLOOD PRESSURE: 128 MMHG | HEIGHT: 61 IN | HEART RATE: 100 BPM | TEMPERATURE: 98 F | OXYGEN SATURATION: 97 % | WEIGHT: 174 LBS | RESPIRATION RATE: 20 BRPM | DIASTOLIC BLOOD PRESSURE: 72 MMHG | BODY MASS INDEX: 32.85 KG/M2

## 2024-08-08 DIAGNOSIS — Z72.0 TOBACCO ABUSE DISORDER: ICD-10-CM

## 2024-08-08 DIAGNOSIS — Z86.39 HISTORY OF NON ANEMIC VITAMIN B12 DEFICIENCY: ICD-10-CM

## 2024-08-08 DIAGNOSIS — E78.2 MIXED HYPERLIPIDEMIA: ICD-10-CM

## 2024-08-08 DIAGNOSIS — L98.9 NON-HEALING SKIN LESION OF NOSE: ICD-10-CM

## 2024-08-08 DIAGNOSIS — F41.9 ANXIETY AND DEPRESSION: Primary | ICD-10-CM

## 2024-08-08 DIAGNOSIS — R73.01 IMPAIRED FASTING BLOOD SUGAR: ICD-10-CM

## 2024-08-08 DIAGNOSIS — E53.8 VITAMIN B 12 DEFICIENCY: ICD-10-CM

## 2024-08-08 DIAGNOSIS — E55.9 VITAMIN D DEFICIENCY: ICD-10-CM

## 2024-08-08 DIAGNOSIS — M81.0 OSTEOPOROSIS, UNSPECIFIED OSTEOPOROSIS TYPE, UNSPECIFIED PATHOLOGICAL FRACTURE PRESENCE: ICD-10-CM

## 2024-08-08 DIAGNOSIS — F32.A ANXIETY AND DEPRESSION: Primary | ICD-10-CM

## 2024-08-08 DIAGNOSIS — J43.9 PULMONARY EMPHYSEMA, UNSPECIFIED EMPHYSEMA TYPE: ICD-10-CM

## 2024-08-08 LAB
25(OH)D3+25(OH)D2 SERPL-MCNC: 20 NG/ML (ref 30–80)
ALBUMIN SERPL-MCNC: 4.2 G/DL (ref 3.4–5)
ALBUMIN/GLOB SERPL: 1.3 RATIO
ALP SERPL-CCNC: 53 UNIT/L (ref 50–144)
ALT SERPL-CCNC: 16 UNIT/L (ref 1–45)
ANION GAP SERPL CALC-SCNC: 8 MEQ/L (ref 2–13)
AST SERPL-CCNC: 25 UNIT/L (ref 14–36)
BASOPHILS # BLD AUTO: 0.04 X10(3)/MCL (ref 0.01–0.08)
BASOPHILS NFR BLD AUTO: 0.8 % (ref 0.1–1.2)
BILIRUB SERPL-MCNC: 0.8 MG/DL (ref 0–1)
BUN SERPL-MCNC: 21 MG/DL (ref 7–20)
CALCIUM SERPL-MCNC: 9.9 MG/DL (ref 8.4–10.2)
CHLORIDE SERPL-SCNC: 103 MMOL/L (ref 98–110)
CHOLEST SERPL-MCNC: 127 MG/DL (ref 0–200)
CO2 SERPL-SCNC: 26 MMOL/L (ref 21–32)
CREAT SERPL-MCNC: 0.87 MG/DL (ref 0.66–1.25)
CREAT/UREA NIT SERPL: 24 (ref 12–20)
EOSINOPHIL # BLD AUTO: 0.18 X10(3)/MCL (ref 0.04–0.36)
EOSINOPHIL NFR BLD AUTO: 3.5 % (ref 0.7–7)
ERYTHROCYTE [DISTWIDTH] IN BLOOD BY AUTOMATED COUNT: 13.2 % (ref 11–14.5)
GFR SERPLBLD CREATININE-BSD FMLA CKD-EPI: 71 ML/MIN/1.73/M2
GLOBULIN SER-MCNC: 3.2 GM/DL (ref 2–3.9)
GLUCOSE SERPL-MCNC: 99 MG/DL (ref 70–115)
HCT VFR BLD AUTO: 44 % (ref 36–48)
HDLC SERPL-MCNC: 51 MG/DL (ref 40–60)
HGB BLD-MCNC: 15.4 G/DL (ref 11.8–16)
IMM GRANULOCYTES # BLD AUTO: 0.01 X10(3)/MCL (ref 0–0.03)
IMM GRANULOCYTES NFR BLD AUTO: 0.2 % (ref 0–0.5)
LDLC SERPL DIRECT ASSAY-SCNC: 54.5 MG/DL (ref 30–100)
LYMPHOCYTES # BLD AUTO: 2.09 X10(3)/MCL (ref 1.16–3.74)
LYMPHOCYTES NFR BLD AUTO: 40.3 % (ref 20–55)
MCH RBC QN AUTO: 31.2 PG (ref 27–34)
MCHC RBC AUTO-ENTMCNC: 35 G/DL (ref 31–37)
MCV RBC AUTO: 89.1 FL (ref 79–99)
MONOCYTES # BLD AUTO: 0.34 X10(3)/MCL (ref 0.24–0.36)
MONOCYTES NFR BLD AUTO: 6.6 % (ref 4.7–12.5)
NEUTROPHILS # BLD AUTO: 2.53 X10(3)/MCL (ref 1.56–6.13)
NEUTROPHILS NFR BLD AUTO: 48.6 % (ref 37–73)
NRBC BLD AUTO-RTO: 0 %
PLATELET # BLD AUTO: 222 X10(3)/MCL (ref 140–371)
PMV BLD AUTO: 12.3 FL (ref 9.4–12.4)
POTASSIUM SERPL-SCNC: 4.7 MMOL/L (ref 3.5–5.1)
PROT SERPL-MCNC: 7.4 GM/DL (ref 6.3–8.2)
RBC # BLD AUTO: 4.94 X10(6)/MCL (ref 4–5.1)
SODIUM SERPL-SCNC: 137 MMOL/L (ref 136–145)
TRIGL SERPL-MCNC: 130 MG/DL (ref 30–200)
VIT B12 SERPL-MCNC: 495 PG/ML (ref 211–946)
WBC # BLD AUTO: 5.19 X10(3)/MCL (ref 4–11.5)

## 2024-08-08 PROCEDURE — 85025 COMPLETE CBC W/AUTO DIFF WBC: CPT | Performed by: NURSE PRACTITIONER

## 2024-08-08 PROCEDURE — 82306 VITAMIN D 25 HYDROXY: CPT | Performed by: NURSE PRACTITIONER

## 2024-08-08 PROCEDURE — 80053 COMPREHEN METABOLIC PANEL: CPT | Performed by: NURSE PRACTITIONER

## 2024-08-08 PROCEDURE — 82607 VITAMIN B-12: CPT | Performed by: NURSE PRACTITIONER

## 2024-08-08 PROCEDURE — 80061 LIPID PANEL: CPT | Performed by: NURSE PRACTITIONER

## 2024-08-08 RX ORDER — ALENDRONATE SODIUM 70 MG/1
70 TABLET ORAL
Qty: 4 TABLET | Refills: 2 | Status: SHIPPED | OUTPATIENT
Start: 2024-08-08 | End: 2024-11-06

## 2024-08-08 RX ORDER — CYANOCOBALAMIN 1000 UG/ML
1000 INJECTION, SOLUTION INTRAMUSCULAR; SUBCUTANEOUS
Qty: 10 ML | Refills: 2 | Status: SHIPPED | OUTPATIENT
Start: 2024-08-08

## 2024-08-12 ENCOUNTER — TELEPHONE (OUTPATIENT)
Dept: FAMILY MEDICINE | Facility: CLINIC | Age: 74
End: 2024-08-12
Payer: COMMERCIAL

## 2024-08-12 NOTE — TELEPHONE ENCOUNTER
----- Message from Jaclyn Casiano DNP sent at 8/12/2024  6:16 AM CDT -----  Notify patient her vitamin-D is very low we will make her feel more tired and increase the chances forgetting infections if we do not elevate this she has supplementation but had not been taking it ask if she is having any problems that would prevent her from taking it.  If cost is the problem let us know work around this.  Send copy of labs to cardiologist the recommendation to keep follow-up control cholesterol and CMP stable.  Continue Lipitor 40 B12, restart vitamin D. Recheck cmp vitamin D in 3 months

## 2024-08-27 ENCOUNTER — TELEPHONE (OUTPATIENT)
Dept: FAMILY MEDICINE | Facility: CLINIC | Age: 74
End: 2024-08-27
Payer: COMMERCIAL

## 2024-08-27 DIAGNOSIS — E78.2 MIXED HYPERLIPIDEMIA: ICD-10-CM

## 2024-08-27 DIAGNOSIS — J43.9 PULMONARY EMPHYSEMA, UNSPECIFIED EMPHYSEMA TYPE: Primary | ICD-10-CM

## 2024-08-27 RX ORDER — ALBUTEROL SULFATE 90 UG/1
2 INHALANT RESPIRATORY (INHALATION) 3 TIMES DAILY PRN
Qty: 18 G | Refills: 2 | Status: SHIPPED | OUTPATIENT
Start: 2024-08-27 | End: 2025-02-23

## 2024-08-27 RX ORDER — FLUTICASONE PROPIONATE AND SALMETEROL 250; 50 UG/1; UG/1
1 POWDER RESPIRATORY (INHALATION) 2 TIMES DAILY
Qty: 180 EACH | Refills: 1 | Status: SHIPPED | OUTPATIENT
Start: 2024-08-27 | End: 2025-02-23

## 2024-08-27 RX ORDER — ATORVASTATIN CALCIUM 40 MG/1
40 TABLET, FILM COATED ORAL NIGHTLY
Qty: 90 TABLET | Refills: 1 | Status: SHIPPED | OUTPATIENT
Start: 2024-08-27

## 2024-08-27 NOTE — TELEPHONE ENCOUNTER
----- Message from Kristin San MA sent at 8/27/2024  2:04 PM CDT -----  Regarding: Refill  She needs a refill of atorvastatin (LIPITOR) 40 MG tablet   Also needs an inhaler (I didn't see one in file) called in. Please make each of them a 90 day supply. To [address]

## 2024-09-16 ENCOUNTER — OFFICE VISIT (OUTPATIENT)
Dept: FAMILY MEDICINE | Facility: CLINIC | Age: 74
End: 2024-09-16
Payer: COMMERCIAL

## 2024-09-16 VITALS
HEIGHT: 61 IN | TEMPERATURE: 98 F | RESPIRATION RATE: 20 BRPM | WEIGHT: 177 LBS | SYSTOLIC BLOOD PRESSURE: 119 MMHG | OXYGEN SATURATION: 98 % | DIASTOLIC BLOOD PRESSURE: 68 MMHG | BODY MASS INDEX: 33.42 KG/M2 | HEART RATE: 82 BPM

## 2024-09-16 DIAGNOSIS — Z23 FLU VACCINE NEED: ICD-10-CM

## 2024-09-16 DIAGNOSIS — E55.9 VITAMIN D DEFICIENCY: ICD-10-CM

## 2024-09-16 DIAGNOSIS — K21.9 GASTROESOPHAGEAL REFLUX DISEASE WITHOUT ESOPHAGITIS: ICD-10-CM

## 2024-09-16 DIAGNOSIS — Z72.0 TOBACCO ABUSE DISORDER: ICD-10-CM

## 2024-09-16 DIAGNOSIS — L82.1 SEBORRHEIC KERATOSES: ICD-10-CM

## 2024-09-16 DIAGNOSIS — J43.9 PULMONARY EMPHYSEMA, UNSPECIFIED EMPHYSEMA TYPE: ICD-10-CM

## 2024-09-16 DIAGNOSIS — L30.9 DERMATITIS: Primary | ICD-10-CM

## 2024-09-16 PROCEDURE — G0008 ADMIN INFLUENZA VIRUS VAC: HCPCS | Mod: ,,, | Performed by: NURSE PRACTITIONER

## 2024-09-16 PROCEDURE — 1157F ADVNC CARE PLAN IN RCRD: CPT | Mod: ,,, | Performed by: NURSE PRACTITIONER

## 2024-09-16 PROCEDURE — 17110 DESTRUCTION B9 LES UP TO 14: CPT | Mod: ,,, | Performed by: NURSE PRACTITIONER

## 2024-09-16 PROCEDURE — 3074F SYST BP LT 130 MM HG: CPT | Mod: ,,, | Performed by: NURSE PRACTITIONER

## 2024-09-16 PROCEDURE — 90653 IIV ADJUVANT VACCINE IM: CPT | Mod: ,,, | Performed by: NURSE PRACTITIONER

## 2024-09-16 PROCEDURE — 99214 OFFICE O/P EST MOD 30 MIN: CPT | Mod: 25,,, | Performed by: NURSE PRACTITIONER

## 2024-09-16 PROCEDURE — 1159F MED LIST DOCD IN RCRD: CPT | Mod: ,,, | Performed by: NURSE PRACTITIONER

## 2024-09-16 PROCEDURE — 3008F BODY MASS INDEX DOCD: CPT | Mod: ,,, | Performed by: NURSE PRACTITIONER

## 2024-09-16 PROCEDURE — 3078F DIAST BP <80 MM HG: CPT | Mod: ,,, | Performed by: NURSE PRACTITIONER

## 2024-09-16 PROCEDURE — 3044F HG A1C LEVEL LT 7.0%: CPT | Mod: ,,, | Performed by: NURSE PRACTITIONER

## 2024-09-16 PROCEDURE — 1160F RVW MEDS BY RX/DR IN RCRD: CPT | Mod: ,,, | Performed by: NURSE PRACTITIONER

## 2024-09-16 RX ORDER — SEMAGLUTIDE 0.25 MG/.5ML
0.25 INJECTION, SOLUTION SUBCUTANEOUS
Qty: 0.5 ML | Refills: 1 | Status: SHIPPED | OUTPATIENT
Start: 2024-09-16

## 2024-09-16 RX ORDER — FLUTICASONE PROPIONATE AND SALMETEROL 250; 50 UG/1; UG/1
1 POWDER RESPIRATORY (INHALATION) 2 TIMES DAILY
Qty: 180 EACH | Refills: 1 | Status: SHIPPED | OUTPATIENT
Start: 2024-09-16 | End: 2025-03-15

## 2024-09-16 RX ORDER — TRIAMCINOLONE ACETONIDE 1 MG/G
CREAM TOPICAL 2 TIMES DAILY
Qty: 80 G | Refills: 1 | Status: SHIPPED | OUTPATIENT
Start: 2024-09-16

## 2024-09-16 NOTE — PROCEDURES
Destruction, Benign Lesion    Date/Time: 9/16/2024 9:20 AM    Performed by: Jaclyn Casiano DNP  Authorized by: Jaclyn Casiano DNP    Consent Done?:  Yes (Verbal)  Pre-Procedure:     Timeout: prior to procedure the correct patient, procedure, and site was verified      Local anesthesia used?: No    Indications:     seborrheic keratosis  Location:     Trunk:  Back  Prep:     Position:  Prone    Preparation: Patient was prepped and draped in usual sterile fashion    Procedure Details:     Cosmetic?: No      Number of lesions:  2    Destruction method:  Cryotherapy    Bleeding:  None     Patient tolerated the procedure well with no immediate complications.     Post-operative instructions were provided for the patient.

## 2024-09-16 NOTE — PROGRESS NOTES
"Patient ID: Natalia Weller  : 1950     Chief Complaint: Follow-up (lungs)    Allergies: Patient is allergic to adhesive.     History of Present Illness:  The patient is a 74 y.o. White female who presents to clinic for evaluation and management with a chief complaint of Follow-up (lungs)   HPI   Patient in clinic with follow-up on lungs. States that she was started on inhalers over 2 weeks ago. Was started on 2 inhalers. Since starting inhalers she broke out in rash to right side of back. Describes rash to be itchy, denies rash to be painful. Has been applying calamine lotion and rubbing alcohol. Patient states that she continues with inhalers. Patient is also wanting flu vaccine.  She also reports to lesions to her left lumbar area and lateral right side that or itching and growing and would like destroyed if possible.    Past Medical History:  has a past medical history of COPD (chronic obstructive pulmonary disease), Insomnia, and Renal cyst.    Social History:  reports that she has been smoking cigarettes. She has been exposed to tobacco smoke. She has never used smokeless tobacco. She reports current alcohol use. She reports that she does not use drugs.    Care Team: Patient Care Team:  Jaclyn Casiano DNP as PCP - General (Family Medicine)  Iraida Kramer FNP as Nurse Practitioner (Cardiology)     Current Medications:  Current Outpatient Medications   Medication Instructions    albuterol (PROAIR HFA) 90 mcg/actuation inhaler 2 puffs, Inhalation, 3 times daily PRN, Rescue    alendronate (FOSAMAX) 70 mg, Oral, Every 7 days    atorvastatin (LIPITOR) 40 mg, Oral, Nightly    cyanocobalamin 1,000 mcg, Subcutaneous, Every 7 days, Needs syringes, supplies for injection    vitamin D (VITAMIN D3) 1,000 Units, Oral, Daily       Review of Systems     Visit Vitals  /68 (BP Location: Left arm, Patient Position: Sitting)   Pulse 82   Temp 97.5 °F (36.4 °C)   Resp 20   Ht 5' 1" (1.549 m)   Wt 80.3 kg (177 lb) "   SpO2 98%   BMI 33.44 kg/m²       Physical Exam     Labs Reviewed:  Chemistry:  Lab Results   Component Value Date     08/08/2024    K 4.7 08/08/2024    BUN 21 (H) 08/08/2024    CREATININE 0.87 08/08/2024    EGFRNORACEVR 71 08/08/2024    GLUCOSE 99 08/08/2024    CALCIUM 9.9 08/08/2024    ALKPHOS 53 08/08/2024    LABPROT 7.4 08/08/2024    ALBUMIN 4.2 08/08/2024    AST 25 08/08/2024    ALT 16 08/08/2024    HETAHDZS66EQ 20 (L) 08/08/2024    TSH 2.080 03/25/2024        Lab Results   Component Value Date    HGBA1C 5.5 03/25/2024        Hematology:  Lab Results   Component Value Date    WBC 5.19 08/08/2024    RBC 4.94 08/08/2024    HGB 15.4 08/08/2024    HCT 44.0 08/08/2024    MCV 89.1 08/08/2024    MCH 31.2 08/08/2024    MCHC 35.0 08/08/2024    RDW 13.2 08/08/2024     08/08/2024    MPV 12.3 08/08/2024       Lipid Panel:  Lab Results   Component Value Date    CHOL 127 08/08/2024    HDL 51 08/08/2024    LDLDIRECT 54.5 08/08/2024    TRIG 130 08/08/2024        Assessment & Plan:  1. Dermatitis  Assessment & Plan:  Triamcinalone 0.1 ml twice daily for rash. Call if rash continue past 10 days      2. Vitamin D deficiency  Overview:  Vitamin D 49472 daily. Recheck vitamin D in 3 months.       3. Tobacco abuse disorder  Assessment & Plan:  It is very important that she quit smoking. There are various alternatives available to help with this difficult task, but first and foremost, she must make a firm commitment and decision to quit. The nature of nicotine addiction is discussed. The usefulness of behavioral therapy is discussed and suggested.  The correct use, cost and side effects of nicotine replacement therapy such as gum or patches is discussed. Bupropion and its cost (sometimes not covered fully by insurance) and side effects are reviewed. The quit rates are discussed. I recommend she not allow potential costs of treatment to deter her from using nicotine replacement therapy or bupropion, as the long term  economic and health benefits are obvious. Counseling 5 minutes.         4. BMI 33.0-33.9,adult  Assessment & Plan:  Discussed more vegetables in diet. Discussed wegovy if insurance will cover. The patient's BMI has been recorded in the chart. The patient has been provided educational materials regarding the benefits of attaining and maintaining a normal weight. We will continue to address and follow this issue during follow up visits.       5. Gastroesophageal reflux disease without esophagitis  Assessment & Plan:  Worsening of reflux with fosamax. Stopped taking with side effects.            Future Appointments   Date Time Provider Department Center   11/5/2024  8:20 AM Jaclyn Casiano DNP [facility] [place]   5/12/2025  7:00 AM Jaclyn Casiano DNP [facility] [place]       No follow-ups on file. Call sooner if needed.    Jaclyn Casiano DNP    Lab Frequency Next Occurrence   X-Ray Chest PA And Lateral Once 08/08/2024   Ambulatory referral/consult to Family Practice Once 08/15/2024

## 2024-09-16 NOTE — ASSESSMENT & PLAN NOTE
Discussed more vegetables in diet. Discussed wegovy if insurance will cover. The patient's BMI has been recorded in the chart. The patient has been provided educational materials regarding the benefits of attaining and maintaining a normal weight. We will continue to address and follow this issue during follow up visits.

## 2024-09-16 NOTE — ASSESSMENT & PLAN NOTE
Pruritic and catching with fingernail causing irritation repeatedly.  Obtaining verbal permission for liquid nitrogen tolerated well wash with soap and water and notify if any sign of infection occurs.

## 2024-09-16 NOTE — ASSESSMENT & PLAN NOTE
It is very important that she quit smoking. There are various alternatives available to help with this difficult task, but first and foremost, she must make a firm commitment and decision to quit. The nature of nicotine addiction is discussed. The usefulness of behavioral therapy is discussed and suggested.  The correct use, cost and side effects of nicotine replacement therapy such as gum or patches is discussed. Bupropion and its cost (sometimes not covered fully by insurance) and side effects are reviewed. The quit rates are discussed. I recommend she not allow potential costs of treatment to deter her from using nicotine replacement therapy or bupropion, as the long term economic and health benefits are obvious. Counseling 5 minutes.

## 2024-10-09 ENCOUNTER — TELEPHONE (OUTPATIENT)
Dept: FAMILY MEDICINE | Facility: CLINIC | Age: 74
End: 2024-10-09
Payer: COMMERCIAL

## 2024-10-09 DIAGNOSIS — E53.8 VITAMIN B 12 DEFICIENCY: ICD-10-CM

## 2024-10-09 DIAGNOSIS — J43.9 PULMONARY EMPHYSEMA, UNSPECIFIED EMPHYSEMA TYPE: ICD-10-CM

## 2024-10-09 RX ORDER — CYANOCOBALAMIN 1000 UG/ML
1000 INJECTION, SOLUTION INTRAMUSCULAR; SUBCUTANEOUS
Qty: 20 ML | Refills: 2 | Status: SHIPPED | OUTPATIENT
Start: 2024-10-09

## 2024-10-09 RX ORDER — SEMAGLUTIDE 0.25 MG/.5ML
0.5 INJECTION, SOLUTION SUBCUTANEOUS
Qty: 6 ML | Refills: 1 | Status: SHIPPED | OUTPATIENT
Start: 2024-10-09

## 2024-10-09 NOTE — TELEPHONE ENCOUNTER
----- Message from Edilma sent at 10/9/2024  9:44 AM CDT -----  Regarding: Med refill  Needs a refill on B12 1,000 and Wegovy 0.25.  She needs a three month supply on both    [address]

## 2024-10-21 ENCOUNTER — OFFICE VISIT (OUTPATIENT)
Dept: FAMILY MEDICINE | Facility: CLINIC | Age: 74
End: 2024-10-21
Payer: COMMERCIAL

## 2024-10-21 VITALS
HEART RATE: 105 BPM | BODY MASS INDEX: 32.85 KG/M2 | RESPIRATION RATE: 20 BRPM | HEIGHT: 61 IN | OXYGEN SATURATION: 97 % | TEMPERATURE: 97 F | WEIGHT: 174 LBS | SYSTOLIC BLOOD PRESSURE: 122 MMHG | DIASTOLIC BLOOD PRESSURE: 78 MMHG

## 2024-10-21 DIAGNOSIS — L82.1 SEBORRHEIC KERATOSES: ICD-10-CM

## 2024-10-21 DIAGNOSIS — J43.9 PULMONARY EMPHYSEMA, UNSPECIFIED EMPHYSEMA TYPE: ICD-10-CM

## 2024-10-21 DIAGNOSIS — M81.0 AGE-RELATED OSTEOPOROSIS WITHOUT CURRENT PATHOLOGICAL FRACTURE: ICD-10-CM

## 2024-10-21 PROCEDURE — 1159F MED LIST DOCD IN RCRD: CPT | Mod: ,,, | Performed by: NURSE PRACTITIONER

## 2024-10-21 PROCEDURE — 3044F HG A1C LEVEL LT 7.0%: CPT | Mod: ,,, | Performed by: NURSE PRACTITIONER

## 2024-10-21 PROCEDURE — 1101F PT FALLS ASSESS-DOCD LE1/YR: CPT | Mod: ,,, | Performed by: NURSE PRACTITIONER

## 2024-10-21 PROCEDURE — 1157F ADVNC CARE PLAN IN RCRD: CPT | Mod: ,,, | Performed by: NURSE PRACTITIONER

## 2024-10-21 PROCEDURE — 17110 DESTRUCTION B9 LES UP TO 14: CPT | Mod: ,,, | Performed by: NURSE PRACTITIONER

## 2024-10-21 PROCEDURE — 3078F DIAST BP <80 MM HG: CPT | Mod: ,,, | Performed by: NURSE PRACTITIONER

## 2024-10-21 PROCEDURE — 3074F SYST BP LT 130 MM HG: CPT | Mod: ,,, | Performed by: NURSE PRACTITIONER

## 2024-10-21 PROCEDURE — 99214 OFFICE O/P EST MOD 30 MIN: CPT | Mod: 25,,, | Performed by: NURSE PRACTITIONER

## 2024-10-21 PROCEDURE — 1160F RVW MEDS BY RX/DR IN RCRD: CPT | Mod: ,,, | Performed by: NURSE PRACTITIONER

## 2024-10-21 PROCEDURE — 3288F FALL RISK ASSESSMENT DOCD: CPT | Mod: ,,, | Performed by: NURSE PRACTITIONER

## 2024-10-21 PROCEDURE — 3008F BODY MASS INDEX DOCD: CPT | Mod: ,,, | Performed by: NURSE PRACTITIONER

## 2024-10-21 RX ORDER — SEMAGLUTIDE 1 MG/.5ML
1 INJECTION, SOLUTION SUBCUTANEOUS
Qty: 0.5 ML | Refills: 1 | Status: SHIPPED | OUTPATIENT
Start: 2024-10-21

## 2024-10-21 NOTE — ASSESSMENT & PLAN NOTE
tolerating fosamax 70 mg every week  Discuss possible once yearly repatha or every 6 month prolia injections.

## 2024-10-21 NOTE — ASSESSMENT & PLAN NOTE
Increase wegovy 1 mg every 7 days with more vegetables in diet. The patient has been provided educational materials regarding the benefits of attaining and maintaining a normal weight. We will continue to address and follow this issue during follow up visits. Lost 3# since starting medication

## 2024-10-21 NOTE — ASSESSMENT & PLAN NOTE
Pruritic and catching with fingernail causing irritation repeatedly three new scaly lesion to lower abdomen and back.  verbal permission for liquid nitrogen tolerated well wash with soap and water and notify if any sign of infection occurs.

## 2024-10-21 NOTE — PROCEDURES
Destruction, Benign Lesion    Date/Time: 10/21/2024 8:40 AM    Performed by: Jaclyn Casiano DNP  Authorized by: Jaclyn Casiano DNP    Consent Done?:  Yes (Verbal)  Pre-Procedure:     Timeout: prior to procedure the correct patient, procedure, and site was verified      Local anesthesia used?: No    Indications:     seborrheic keratosis  Location:     Trunk:  Abdomen and back  Prep:     Position:  Supine    Preparation: Patient was prepped and draped in usual sterile fashion    Procedure Details:     Cosmetic?: No      Number of lesions:  3    Bleeding:  None     Patient tolerated the procedure well with no immediate complications.     Post-operative instructions were provided for the patient.

## 2024-10-21 NOTE — ASSESSMENT & PLAN NOTE
No restriction with most recent PFT 8/24, using advair 250/50 twice daily and not needing albuterol. good response with albuterol. It is very important that she quit smoking. There are various alternatives available to help with this difficult task, but first and foremost, she must make a firm commitment and decision to quit. The nature of nicotine addiction is discussed. The usefulness of behavioral therapy is discussed and suggested.  The correct use, cost and side effects of nicotine replacement therapy such as gum or patches is discussed. Bupropion and its cost (sometimes not covered fully by insurance) and side effects are reviewed. The quit rates are discussed. I recommend she not allow potential costs of treatment to deter her from using nicotine replacement therapy or bupropion, as the long term economic and health benefits are obvious.

## 2024-10-21 NOTE — PROGRESS NOTES
Patient ID: Natalia Weller  : 1950     Chief Complaint: Weight Check    Allergies: Patient is allergic to adhesive.     History of Present Illness:  The patient is a 74 y.o. White female who presents to clinic for evaluation and management with a chief complaint of Weight Check   HPI   Patient in clinic with 1 month follow-up on wegovy. Patient states that injection has been working well. Was at 177 and now at 174. No negative side effects. Not needing refills. States that she recently got refills. Patient states that she stopped b-12 injection due to insurance no longer paying for it.    Past Medical History:  has a past medical history of COPD (chronic obstructive pulmonary disease), Insomnia, and Renal cyst.    Social History:  reports that she has been smoking cigarettes. She has been exposed to tobacco smoke. She has never used smokeless tobacco. She reports current alcohol use. She reports that she does not use drugs.    Care Team: Patient Care Team:  Jaclyn Casiano DNP as PCP - General (Family Medicine)  Iraida Kramer FNP as Nurse Practitioner (Cardiology)     Current Medications:  Current Outpatient Medications   Medication Instructions    albuterol (PROAIR HFA) 90 mcg/actuation inhaler 2 puffs, Inhalation, 3 times daily PRN, Rescue    alendronate (FOSAMAX) 70 mg, Oral, Every 7 days    atorvastatin (LIPITOR) 40 mg, Oral, Nightly    fluticasone-salmeterol diskus inhaler 250-50 mcg 1 puff, Inhalation, 2 times daily, Controller    triamcinolone acetonide 0.1% (KENALOG) 0.1 % cream Topical (Top), 2 times daily, To rash    vitamin D (VITAMIN D3) 1,000 Units, Oral, Daily       Review of Systems   Constitutional: Negative.    Respiratory:  Positive for cough. Negative for choking, shortness of breath and wheezing.    Cardiovascular: Negative.    Gastrointestinal:  Positive for constipation (resolved after starting medication) and reflux.   Endocrine: Negative.    Musculoskeletal:  Positive for back pain.  "  Integumentary:  Positive for mole/lesion (itching).   Allergic/Immunologic: Positive for environmental allergies.   Neurological: Negative.    Hematological: Negative.    Psychiatric/Behavioral: Negative.          Visit Vitals  /78 (BP Location: Left arm, Patient Position: Sitting)   Pulse 105   Temp 97.4 °F (36.3 °C)   Resp 20   Ht 5' 1" (1.549 m)   Wt 78.9 kg (174 lb)   SpO2 97%   BMI 32.88 kg/m²       Physical Exam  Constitutional:       Appearance: Normal appearance.   HENT:      Head: Normocephalic.      Nose: Nose normal.      Mouth/Throat:      Mouth: Mucous membranes are dry.   Eyes:      Extraocular Movements: Extraocular movements intact.      Conjunctiva/sclera: Conjunctivae normal.   Cardiovascular:      Rate and Rhythm: Normal rate and regular rhythm.      Pulses: Normal pulses.      Heart sounds: Normal heart sounds.   Pulmonary:      Effort: Pulmonary effort is normal.      Breath sounds: Normal breath sounds.   Abdominal:      General: Bowel sounds are normal.      Palpations: Abdomen is soft.   Musculoskeletal:         General: Normal range of motion.      Cervical back: Normal range of motion.   Skin:     General: Skin is warm and dry.      Capillary Refill: Capillary refill takes less than 2 seconds.             Comments: Dry scaly flaky brown lesion   Neurological:      General: No focal deficit present.      Mental Status: She is alert.   Psychiatric:         Mood and Affect: Mood normal.          Labs Reviewed:  Chemistry:  Lab Results   Component Value Date     08/08/2024    K 4.7 08/08/2024    BUN 21 (H) 08/08/2024    CREATININE 0.87 08/08/2024    EGFRNORACEVR 71 08/08/2024    GLUCOSE 99 08/08/2024    CALCIUM 9.9 08/08/2024    ALKPHOS 53 08/08/2024    LABPROT 7.4 08/08/2024    ALBUMIN 4.2 08/08/2024    AST 25 08/08/2024    ALT 16 08/08/2024    [id number] 20 (L) 08/08/2024    TSH 2.080 03/25/2024        Lab Results   Component Value Date    HGBA1C 5.5 03/25/2024    "     Hematology:  Lab Results   Component Value Date    WBC 5.19 08/08/2024    RBC 4.94 08/08/2024    HGB 15.4 08/08/2024    HCT 44.0 08/08/2024    MCV 89.1 08/08/2024    MCH 31.2 08/08/2024    MCHC 35.0 08/08/2024    RDW 13.2 08/08/2024     08/08/2024    MPV 12.3 08/08/2024       Lipid Panel:  Lab Results   Component Value Date    CHOL 127 08/08/2024    HDL 51 08/08/2024    LDLDIRECT 54.5 08/08/2024    TRIG 130 08/08/2024        Assessment & Plan:  1. BMI 32.0-32.9,adult  Assessment & Plan:  Increase wegovy 1 mg every 7 days with more vegetables in diet. The patient has been provided educational materials regarding the benefits of attaining and maintaining a normal weight. We will continue to address and follow this issue during follow up visits. Lost 3# since starting medication      2. Age-related osteoporosis without current pathological fracture  Assessment & Plan:  tolerating fosamax 70 mg every week  Discuss possible once yearly repatha or every 6 month prolia injections.       3. Pulmonary emphysema, unspecified emphysema type  Assessment & Plan:  No restriction with most recent PFT 8/24, using advair 250/50 twice daily and not needing albuterol. good response with albuterol. It is very important that she quit smoking. There are various alternatives available to help with this difficult task, but first and foremost, she must make a firm commitment and decision to quit. The nature of nicotine addiction is discussed. The usefulness of behavioral therapy is discussed and suggested.  The correct use, cost and side effects of nicotine replacement therapy such as gum or patches is discussed. Bupropion and its cost (sometimes not covered fully by insurance) and side effects are reviewed. The quit rates are discussed. I recommend she not allow potential costs of treatment to deter her from using nicotine replacement therapy or bupropion, as the long term economic and health benefits are obvious.       4.  Seborrheic keratoses  Assessment & Plan:  Pruritic and catching with fingernail causing irritation repeatedly three new scaly lesion to lower abdomen and back.  verbal permission for liquid nitrogen tolerated well wash with soap and water and notify if any sign of infection occurs.            Future Appointments   Date Time Provider Department Center   11/5/2024  8:20 AM Jaclyn Casiano DNP [facility] [place]   5/12/2025  7:00 AM Jaclyn Casiano DNP [facility] [place]       Follow up in about 2 months (around 12/21/2024). Call sooner if needed.    Jaclyn Casiano DNP    Lab Frequency Next Occurrence   X-Ray Chest PA And Lateral Once 08/08/2024   Ambulatory referral/consult to Family Practice Once 08/15/2024

## 2024-11-20 ENCOUNTER — OFFICE VISIT (OUTPATIENT)
Dept: FAMILY MEDICINE | Facility: CLINIC | Age: 74
End: 2024-11-20
Payer: COMMERCIAL

## 2024-11-20 VITALS
HEIGHT: 61 IN | OXYGEN SATURATION: 100 % | TEMPERATURE: 97 F | WEIGHT: 174 LBS | BODY MASS INDEX: 32.85 KG/M2 | HEART RATE: 90 BPM | DIASTOLIC BLOOD PRESSURE: 71 MMHG | SYSTOLIC BLOOD PRESSURE: 128 MMHG | RESPIRATION RATE: 20 BRPM

## 2024-11-20 DIAGNOSIS — E66.811 CLASS 1 OBESITY DUE TO EXCESS CALORIES WITH SERIOUS COMORBIDITY AND BODY MASS INDEX (BMI) OF 32.0 TO 32.9 IN ADULT: Primary | ICD-10-CM

## 2024-11-20 DIAGNOSIS — L82.1 SEBORRHEIC KERATOSES: ICD-10-CM

## 2024-11-20 DIAGNOSIS — E55.9 VITAMIN D DEFICIENCY: ICD-10-CM

## 2024-11-20 DIAGNOSIS — E66.09 CLASS 1 OBESITY DUE TO EXCESS CALORIES WITH SERIOUS COMORBIDITY AND BODY MASS INDEX (BMI) OF 32.0 TO 32.9 IN ADULT: Primary | ICD-10-CM

## 2024-11-20 DIAGNOSIS — R11.0 NAUSEA: ICD-10-CM

## 2024-11-20 PROCEDURE — 1101F PT FALLS ASSESS-DOCD LE1/YR: CPT | Mod: ,,, | Performed by: NURSE PRACTITIONER

## 2024-11-20 PROCEDURE — 1157F ADVNC CARE PLAN IN RCRD: CPT | Mod: ,,, | Performed by: NURSE PRACTITIONER

## 2024-11-20 PROCEDURE — 3008F BODY MASS INDEX DOCD: CPT | Mod: ,,, | Performed by: NURSE PRACTITIONER

## 2024-11-20 PROCEDURE — 1160F RVW MEDS BY RX/DR IN RCRD: CPT | Mod: ,,, | Performed by: NURSE PRACTITIONER

## 2024-11-20 PROCEDURE — 3078F DIAST BP <80 MM HG: CPT | Mod: ,,, | Performed by: NURSE PRACTITIONER

## 2024-11-20 PROCEDURE — 3044F HG A1C LEVEL LT 7.0%: CPT | Mod: ,,, | Performed by: NURSE PRACTITIONER

## 2024-11-20 PROCEDURE — 1159F MED LIST DOCD IN RCRD: CPT | Mod: ,,, | Performed by: NURSE PRACTITIONER

## 2024-11-20 PROCEDURE — 99214 OFFICE O/P EST MOD 30 MIN: CPT | Mod: ,,, | Performed by: NURSE PRACTITIONER

## 2024-11-20 PROCEDURE — 3074F SYST BP LT 130 MM HG: CPT | Mod: ,,, | Performed by: NURSE PRACTITIONER

## 2024-11-20 PROCEDURE — 3288F FALL RISK ASSESSMENT DOCD: CPT | Mod: ,,, | Performed by: NURSE PRACTITIONER

## 2024-11-20 RX ORDER — ONDANSETRON HYDROCHLORIDE 8 MG/1
8 TABLET, FILM COATED ORAL EVERY 8 HOURS PRN
Qty: 24 TABLET | Refills: 1 | Status: SHIPPED | OUTPATIENT
Start: 2024-11-20

## 2024-11-20 RX ORDER — SEMAGLUTIDE 0.5 MG/.5ML
0.5 INJECTION, SOLUTION SUBCUTANEOUS
Qty: 0.5 ML | Refills: 3 | Status: SHIPPED | OUTPATIENT
Start: 2024-11-20

## 2024-11-20 NOTE — ASSESSMENT & PLAN NOTE
Increased wegovy 1 mg every 7 days but more nausea and unable to tolerate. Decrease wegovy to 0.5 mg every 7 days with zofran 8 mg prn every 8 hours. with more vegetables in diet. The patient has been provided educational materials regarding the benefits of attaining and maintaining a normal weight.

## 2024-11-20 NOTE — PROGRESS NOTES
Patient ID: Natalia Weller  : 1950     Chief Complaint: weight loss  Allergies: Patient is allergic to adhesive.     History of Present Illness:  The patient is a 74 y.o. White female who presents to clinic for evaluation and management with a chief complaint of weight loss   HPI   Patient in clinic with 1 month follow-up on wegovy. States that the 1 mg was causing her to feel nauseated for 3 days. Otherwise injection is working well. Patient states that otherwise medications are working well to decrease appetite. Still with lumbar pain and taking cholesterol and vitamin D supplement. Denied heartburn.   Past Medical History:  has a past medical history of COPD (chronic obstructive pulmonary disease), Insomnia, and Renal cyst.    Social History:  reports that she has been smoking cigarettes. She has been exposed to tobacco smoke. She has never used smokeless tobacco. She reports current alcohol use. She reports that she does not use drugs.    Care Team: Patient Care Team:  Jaclyn Casiano DNP as PCP - General (Family Medicine)  Iraida Kramer FNP as Nurse Practitioner (Cardiology)     Current Medications:  Current Outpatient Medications   Medication Instructions    albuterol (PROAIR HFA) 90 mcg/actuation inhaler 2 puffs, Inhalation, 3 times daily PRN, Rescue    alendronate (FOSAMAX) 70 mg, Oral, Every 7 days    atorvastatin (LIPITOR) 40 mg, Oral, Nightly    fluticasone-salmeterol diskus inhaler 250-50 mcg 1 puff, Inhalation, 2 times daily, Controller    ondansetron (ZOFRAN) 8 mg, Oral, Every 8 hours PRN    triamcinolone acetonide 0.1% (KENALOG) 0.1 % cream Topical (Top), 2 times daily, To rash    vitamin D (VITAMIN D3) 1,000 Units, Oral, Daily    WEGOVY 0.5 mg, Subcutaneous, Every 7 days, Dose decrease for weight loss       Review of Systems   Constitutional:  Positive for appetite change.   Respiratory:  Positive for cough. Negative for shortness of breath and wheezing.    Cardiovascular: Negative.   "  Gastrointestinal:  Positive for nausea and vomiting.   Integumentary:  Negative.   Hematological: Negative.    Psychiatric/Behavioral: Negative.     All other systems reviewed and are negative.       Visit Vitals  /71 (BP Location: Left arm, Patient Position: Sitting)   Pulse 90   Temp 97 °F (36.1 °C)   Resp 20   Ht 5' 1" (1.549 m)   Wt 78.9 kg (174 lb)   SpO2 100%   BMI 32.88 kg/m²       Physical Exam  Vitals and nursing note reviewed.   Constitutional:       General: She is not in acute distress.     Appearance: She is not ill-appearing.   HENT:      Head: Normocephalic and atraumatic.      Mouth/Throat:      Mouth: Mucous membranes are moist.      Pharynx: Oropharynx is clear.   Eyes:      General: No scleral icterus.     Extraocular Movements: Extraocular movements intact.      Conjunctiva/sclera: Conjunctivae normal.      Pupils: Pupils are equal, round, and reactive to light.   Neck:      Vascular: No carotid bruit.   Cardiovascular:      Rate and Rhythm: Normal rate and regular rhythm.      Pulses: Normal pulses.      Heart sounds: Normal heart sounds. No murmur heard.     No friction rub. No gallop.   Pulmonary:      Effort: Pulmonary effort is normal. No respiratory distress.      Breath sounds: Normal breath sounds. No wheezing, rhonchi or rales.   Abdominal:      General: Abdomen is flat. Bowel sounds are normal. There is no distension.      Palpations: Abdomen is soft. There is no mass.      Tenderness: There is no abdominal tenderness.   Musculoskeletal:         General: Normal range of motion.      Cervical back: Normal range of motion and neck supple.   Skin:     General: Skin is warm and dry.   Neurological:      General: No focal deficit present.      Mental Status: She is alert and oriented to person, place, and time.   Psychiatric:         Mood and Affect: Mood normal.          Labs Reviewed:  Chemistry:  Lab Results   Component Value Date     08/08/2024    K 4.7 08/08/2024    BUN 21 " (H) 08/08/2024    CREATININE 0.87 08/08/2024    EGFRNORACEVR 71 08/08/2024    GLUCOSE 99 08/08/2024    CALCIUM 9.9 08/08/2024    ALKPHOS 53 08/08/2024    LABPROT 7.4 08/08/2024    ALBUMIN 4.2 08/08/2024    AST 25 08/08/2024    ALT 16 08/08/2024    [id number] 20 (L) 08/08/2024    TSH 2.080 03/25/2024        Lab Results   Component Value Date    HGBA1C 5.5 03/25/2024        Hematology:  Lab Results   Component Value Date    WBC 5.19 08/08/2024    RBC 4.94 08/08/2024    HGB 15.4 08/08/2024    HCT 44.0 08/08/2024    MCV 89.1 08/08/2024    MCH 31.2 08/08/2024    MCHC 35.0 08/08/2024    RDW 13.2 08/08/2024     08/08/2024    MPV 12.3 08/08/2024       Lipid Panel:  Lab Results   Component Value Date    CHOL 127 08/08/2024    HDL 51 08/08/2024    LDLDIRECT 54.5 08/08/2024    TRIG 130 08/08/2024        Assessment & Plan:  1. Class 1 obesity due to excess calories with serious comorbidity and body mass index (BMI) of 32.0 to 32.9 in adult  Assessment & Plan:  Increased wegovy 1 mg every 7 days but more nausea and unable to tolerate. Decrease wegovy to 0.5 mg every 7 days with zofran 8 mg prn every 8 hours. with more vegetables in diet. The patient has been provided educational materials regarding the benefits of attaining and maintaining a normal weight.     Orders:  -     semaglutide, weight loss, (WEGOVY) 0.5 mg/0.5 mL PnIj; Inject 0.5 mg into the skin every 7 days. Dose decrease for weight loss  Dispense: 0.5 mL; Refill: 3    2. Vitamin D deficiency  Overview:  Vitamin D 79772 daily. Recheck vitamin D in 3 months.     Assessment & Plan:  Last vitamin D August 20, recheck today and notify results.       3. BMI 32.0-32.9,adult    4. Seborrheic keratoses    5. Nausea  -     ondansetron (ZOFRAN) 8 MG tablet; Take 1 tablet (8 mg total) by mouth every 8 (eight) hours as needed for Nausea.  Dispense: 24 tablet; Refill: 1         Future Appointments   Date Time Provider Department Center   5/12/2025  7:00 AM [facility]  [facility]       Follow up in about 3 months (around 2/20/2025). Call sooner if needed.    Jaclyn Casiano DNP    Lab Frequency Next Occurrence   X-Ray Chest PA And Lateral Once 08/08/2024   Ambulatory referral/consult to Family Practice Once 08/15/2024

## 2024-12-11 ENCOUNTER — TELEPHONE (OUTPATIENT)
Dept: FAMILY MEDICINE | Facility: CLINIC | Age: 74
End: 2024-12-11
Payer: COMMERCIAL

## 2024-12-11 DIAGNOSIS — E78.2 MIXED HYPERLIPIDEMIA: ICD-10-CM

## 2024-12-11 DIAGNOSIS — E66.09 CLASS 1 OBESITY DUE TO EXCESS CALORIES WITH SERIOUS COMORBIDITY AND BODY MASS INDEX (BMI) OF 32.0 TO 32.9 IN ADULT: ICD-10-CM

## 2024-12-11 DIAGNOSIS — E66.811 CLASS 1 OBESITY DUE TO EXCESS CALORIES WITH SERIOUS COMORBIDITY AND BODY MASS INDEX (BMI) OF 32.0 TO 32.9 IN ADULT: ICD-10-CM

## 2024-12-11 RX ORDER — ATORVASTATIN CALCIUM 40 MG/1
40 TABLET, FILM COATED ORAL NIGHTLY
Qty: 90 TABLET | Refills: 1 | Status: SHIPPED | OUTPATIENT
Start: 2024-12-11

## 2024-12-11 RX ORDER — SEMAGLUTIDE 0.5 MG/.5ML
0.5 INJECTION, SOLUTION SUBCUTANEOUS
Qty: 1.5 ML | Refills: 0 | Status: SHIPPED | OUTPATIENT
Start: 2024-12-11

## 2024-12-11 NOTE — TELEPHONE ENCOUNTER
----- Message from Radha sent at 12/11/2024  8:10 AM CST -----  Patient needs  90 day refills on Atorvastatin 40 mg and Wegovy 0.5 called [address]

## 2025-01-14 ENCOUNTER — TELEPHONE (OUTPATIENT)
Dept: FAMILY MEDICINE | Facility: CLINIC | Age: 75
End: 2025-01-14
Payer: COMMERCIAL

## 2025-01-14 DIAGNOSIS — E66.811 CLASS 1 OBESITY DUE TO EXCESS CALORIES WITH SERIOUS COMORBIDITY AND BODY MASS INDEX (BMI) OF 32.0 TO 32.9 IN ADULT: ICD-10-CM

## 2025-01-14 DIAGNOSIS — E66.09 CLASS 1 OBESITY DUE TO EXCESS CALORIES WITH SERIOUS COMORBIDITY AND BODY MASS INDEX (BMI) OF 32.0 TO 32.9 IN ADULT: ICD-10-CM

## 2025-01-14 RX ORDER — SEMAGLUTIDE 0.5 MG/.5ML
0.5 INJECTION, SOLUTION SUBCUTANEOUS
Qty: 1.5 ML | Refills: 0 | Status: SHIPPED | OUTPATIENT
Start: 2025-01-14

## 2025-01-14 NOTE — TELEPHONE ENCOUNTER
----- Message from Edilma sent at 1/14/2025 10:19 AM CST -----  Regarding: Med refill  Needs Wegovy 0.5mg  [address]

## 2025-02-10 ENCOUNTER — OFFICE VISIT (OUTPATIENT)
Dept: FAMILY MEDICINE | Facility: CLINIC | Age: 75
End: 2025-02-10
Payer: COMMERCIAL

## 2025-02-10 VITALS
DIASTOLIC BLOOD PRESSURE: 72 MMHG | OXYGEN SATURATION: 95 % | BODY MASS INDEX: 32.1 KG/M2 | SYSTOLIC BLOOD PRESSURE: 130 MMHG | TEMPERATURE: 97 F | RESPIRATION RATE: 20 BRPM | HEIGHT: 61 IN | WEIGHT: 170 LBS | HEART RATE: 117 BPM

## 2025-02-10 DIAGNOSIS — E78.2 MIXED HYPERLIPIDEMIA: ICD-10-CM

## 2025-02-10 DIAGNOSIS — Z85.038 PERSONAL HISTORY OF COLON CANCER: ICD-10-CM

## 2025-02-10 DIAGNOSIS — Z91.148 NON COMPLIANCE W MEDICATION REGIMEN: ICD-10-CM

## 2025-02-10 DIAGNOSIS — E66.811 CLASS 1 OBESITY DUE TO EXCESS CALORIES WITH SERIOUS COMORBIDITY AND BODY MASS INDEX (BMI) OF 32.0 TO 32.9 IN ADULT: ICD-10-CM

## 2025-02-10 DIAGNOSIS — F32.A ANXIETY AND DEPRESSION: Primary | ICD-10-CM

## 2025-02-10 DIAGNOSIS — R25.2 BILATERAL LEG CRAMPS: ICD-10-CM

## 2025-02-10 DIAGNOSIS — R79.0 LOW MAGNESIUM LEVEL: ICD-10-CM

## 2025-02-10 DIAGNOSIS — C64.2 RENAL CANCER, LEFT: ICD-10-CM

## 2025-02-10 DIAGNOSIS — F41.9 ANXIETY AND DEPRESSION: Primary | ICD-10-CM

## 2025-02-10 DIAGNOSIS — G25.81 RESTLESS LEG SYNDROME: ICD-10-CM

## 2025-02-10 DIAGNOSIS — R11.0 NAUSEA: ICD-10-CM

## 2025-02-10 DIAGNOSIS — E55.9 VITAMIN D DEFICIENCY: ICD-10-CM

## 2025-02-10 DIAGNOSIS — E66.09 CLASS 1 OBESITY DUE TO EXCESS CALORIES WITH SERIOUS COMORBIDITY AND BODY MASS INDEX (BMI) OF 32.0 TO 32.9 IN ADULT: ICD-10-CM

## 2025-02-10 DIAGNOSIS — E61.1 IRON DEFICIENCY: ICD-10-CM

## 2025-02-10 DIAGNOSIS — Z72.0 TOBACCO ABUSE DISORDER: ICD-10-CM

## 2025-02-10 DIAGNOSIS — J43.9 PULMONARY EMPHYSEMA, UNSPECIFIED EMPHYSEMA TYPE: ICD-10-CM

## 2025-02-10 DIAGNOSIS — K21.9 GASTROESOPHAGEAL REFLUX DISEASE WITHOUT ESOPHAGITIS: ICD-10-CM

## 2025-02-10 LAB
25(OH)D3+25(OH)D2 SERPL-MCNC: 65 NG/ML (ref 30–80)
ALBUMIN SERPL-MCNC: 4.6 G/DL (ref 3.4–5)
ALBUMIN/GLOB SERPL: 1.6 RATIO
ALP SERPL-CCNC: 53 UNIT/L (ref 50–144)
ALT SERPL-CCNC: 17 UNIT/L (ref 1–45)
ANION GAP SERPL CALC-SCNC: 5 MEQ/L (ref 2–13)
AST SERPL-CCNC: 22 UNIT/L (ref 14–36)
BASOPHILS # BLD AUTO: 0.04 X10(3)/MCL (ref 0.01–0.08)
BASOPHILS NFR BLD AUTO: 0.7 % (ref 0.1–1.2)
BILIRUB SERPL-MCNC: 1 MG/DL (ref 0–1)
BUN SERPL-MCNC: 21 MG/DL (ref 7–20)
CALCIUM SERPL-MCNC: 10.3 MG/DL (ref 8.4–10.2)
CHLORIDE SERPL-SCNC: 106 MMOL/L (ref 98–110)
CHOLEST SERPL-MCNC: 140 MG/DL (ref 0–200)
CO2 SERPL-SCNC: 27 MMOL/L (ref 21–32)
CREAT SERPL-MCNC: 0.95 MG/DL (ref 0.66–1.25)
CREAT/UREA NIT SERPL: 22 (ref 12–20)
EOSINOPHIL # BLD AUTO: 0.18 X10(3)/MCL (ref 0.04–0.36)
EOSINOPHIL NFR BLD AUTO: 3.2 % (ref 0.7–7)
ERYTHROCYTE [DISTWIDTH] IN BLOOD BY AUTOMATED COUNT: 13.4 % (ref 11–14.5)
GFR SERPLBLD CREATININE-BSD FMLA CKD-EPI: 64 ML/MIN/1.73/M2
GLOBULIN SER-MCNC: 2.9 GM/DL (ref 2–3.9)
GLUCOSE SERPL-MCNC: 105 MG/DL (ref 70–115)
HCT VFR BLD AUTO: 43.4 % (ref 36–48)
HDLC SERPL-MCNC: 51 MG/DL (ref 40–60)
HGB BLD-MCNC: 14.9 G/DL (ref 11.8–16)
IMM GRANULOCYTES # BLD AUTO: 0.01 X10(3)/MCL (ref 0–0.03)
IMM GRANULOCYTES NFR BLD AUTO: 0.2 % (ref 0–0.5)
IRON SERPL-MCNC: 87 UG/DL (ref 37–170)
LDLC SERPL DIRECT ASSAY-SCNC: 63.2 MG/DL (ref 30–100)
LYMPHOCYTES # BLD AUTO: 2.29 X10(3)/MCL (ref 1.16–3.74)
LYMPHOCYTES NFR BLD AUTO: 40.1 % (ref 20–55)
MAGNESIUM SERPL-MCNC: 2.1 MG/DL (ref 1.8–2.4)
MCH RBC QN AUTO: 30.7 PG (ref 27–34)
MCHC RBC AUTO-ENTMCNC: 34.3 G/DL (ref 31–37)
MCV RBC AUTO: 89.3 FL (ref 79–99)
MONOCYTES # BLD AUTO: 0.36 X10(3)/MCL (ref 0.24–0.36)
MONOCYTES NFR BLD AUTO: 6.3 % (ref 4.7–12.5)
NEUTROPHILS # BLD AUTO: 2.83 X10(3)/MCL (ref 1.56–6.13)
NEUTROPHILS NFR BLD AUTO: 49.5 % (ref 37–73)
NRBC BLD AUTO-RTO: 0 %
PLATELET # BLD AUTO: 226 X10(3)/MCL (ref 140–371)
PMV BLD AUTO: 12.4 FL (ref 9.4–12.4)
POTASSIUM SERPL-SCNC: 5.4 MMOL/L (ref 3.5–5.1)
PROT SERPL-MCNC: 7.5 GM/DL (ref 6.3–8.2)
RBC # BLD AUTO: 4.86 X10(6)/MCL (ref 4–5.1)
SODIUM SERPL-SCNC: 138 MMOL/L (ref 136–145)
TRIGL SERPL-MCNC: 113 MG/DL (ref 30–200)
WBC # BLD AUTO: 5.71 X10(3)/MCL (ref 4–11.5)

## 2025-02-10 PROCEDURE — 1157F ADVNC CARE PLAN IN RCRD: CPT | Mod: ,,, | Performed by: NURSE PRACTITIONER

## 2025-02-10 PROCEDURE — 3078F DIAST BP <80 MM HG: CPT | Mod: ,,, | Performed by: NURSE PRACTITIONER

## 2025-02-10 PROCEDURE — 80061 LIPID PANEL: CPT | Performed by: NURSE PRACTITIONER

## 2025-02-10 PROCEDURE — 3075F SYST BP GE 130 - 139MM HG: CPT | Mod: ,,, | Performed by: NURSE PRACTITIONER

## 2025-02-10 PROCEDURE — 1159F MED LIST DOCD IN RCRD: CPT | Mod: ,,, | Performed by: NURSE PRACTITIONER

## 2025-02-10 PROCEDURE — 3008F BODY MASS INDEX DOCD: CPT | Mod: ,,, | Performed by: NURSE PRACTITIONER

## 2025-02-10 PROCEDURE — 80053 COMPREHEN METABOLIC PANEL: CPT | Performed by: NURSE PRACTITIONER

## 2025-02-10 PROCEDURE — 83735 ASSAY OF MAGNESIUM: CPT | Performed by: NURSE PRACTITIONER

## 2025-02-10 PROCEDURE — 99214 OFFICE O/P EST MOD 30 MIN: CPT | Mod: ,,, | Performed by: NURSE PRACTITIONER

## 2025-02-10 PROCEDURE — 82306 VITAMIN D 25 HYDROXY: CPT | Performed by: NURSE PRACTITIONER

## 2025-02-10 PROCEDURE — 83540 ASSAY OF IRON: CPT | Performed by: NURSE PRACTITIONER

## 2025-02-10 PROCEDURE — 85025 COMPLETE CBC W/AUTO DIFF WBC: CPT | Performed by: NURSE PRACTITIONER

## 2025-02-10 PROCEDURE — 1160F RVW MEDS BY RX/DR IN RCRD: CPT | Mod: ,,, | Performed by: NURSE PRACTITIONER

## 2025-02-10 RX ORDER — GABAPENTIN 100 MG/1
100 CAPSULE ORAL 3 TIMES DAILY PRN
Qty: 90 CAPSULE | Refills: 1 | Status: SHIPPED | OUTPATIENT
Start: 2025-02-10

## 2025-02-10 RX ORDER — SEMAGLUTIDE 1 MG/.5ML
1 INJECTION, SOLUTION SUBCUTANEOUS
Qty: 4 ML | Refills: 1 | Status: SHIPPED | OUTPATIENT
Start: 2025-02-10

## 2025-02-10 RX ORDER — ONDANSETRON HYDROCHLORIDE 8 MG/1
8 TABLET, FILM COATED ORAL EVERY 8 HOURS PRN
Qty: 24 TABLET | Refills: 1 | Status: SHIPPED | OUTPATIENT
Start: 2025-02-10

## 2025-02-10 RX ORDER — BUPROPION HYDROCHLORIDE 150 MG/1
150 TABLET ORAL DAILY
Qty: 30 TABLET | Refills: 11 | Status: SHIPPED | OUTPATIENT
Start: 2025-02-10 | End: 2026-02-10

## 2025-02-10 NOTE — ASSESSMENT & PLAN NOTE
Off wellbutrin for past year, but has been feeling more sadness and taking care of a ill brother at this time.  Also feels the Wellbutrin may help her to quit smoking or at least decrease again.

## 2025-02-10 NOTE — ASSESSMENT & PLAN NOTE
No restriction with most recent PFT 8/24, using advair 250/50 twice daily and not needing albuterol. good response with albuterol.

## 2025-02-10 NOTE — ASSESSMENT & PLAN NOTE
Continues to report intermittent leg cramping and feeling heavy. Check cmp with mg. Will notify with results of lab draw when available. Continue current treatment until results available.

## 2025-02-10 NOTE — ASSESSMENT & PLAN NOTE
Has not called to schedule appointment for follow up post colon cancer colonoscopy with Addy Cobb, phone number given for call. Also has not followed with cardiology. Still smoking and discussed need to quit smoking.

## 2025-02-10 NOTE — ASSESSMENT & PLAN NOTE
No restriction with most recent PFT 8/24, using advair 250/50 twice daily and not needing albuterol. good response with albuterol. Symptoms improved but not needing albuterol.

## 2025-02-10 NOTE — ASSESSMENT & PLAN NOTE
Had tried wegovy 1 mg every 7 days but experienced nausea and unable to tolerate. Decreased wegovy to 0.5 mg every 7 days with zofran 8 mg prn every 8 hours. But increase to 1 mg again. Maintained only 4#weight loss and eating more vegetables in diet. The patient has been provided educational materials regarding the benefits of attaining and maintaining a normal weight.

## 2025-02-10 NOTE — ASSESSMENT & PLAN NOTE
In 2011 she was diagnosed with stage IV left kidney cancer and had it removed in has been with the oncologist but has since changed that. At this time she has an appointment with the oncologist that does try to come to [facility] but she is determined that she does not want any further maintenance or any additional evaluation or treatment of this at this time.

## 2025-02-10 NOTE — ASSESSMENT & PLAN NOTE
Gabapentin 100 mg tid prn restless leg/lumbar pain. Begin with 100 mg but could increase to two tab if needed at night.

## 2025-02-10 NOTE — PROGRESS NOTES
Patient ID: Natalia Weller  : 1950     Chief Complaint: Follow-up    Allergies: Patient is allergic to adhesive.     History of Present Illness:  The patient is a 74 y.o. White female who presents to clinic for evaluation and management with a chief complaint of Follow-up has here for follow-up labs and chronic conditions  History of Present Illness  has been feeling more sadness with a brother who is ill lately and smoking pending upon the day 1-1-1/2 packs of cigarettes.  Has agreed that restarting the Wellbutrin might be desirable.  Had no further GI upset with the Zofran for the nausea when taking the Ozempic at the 0.5 but had only maintained a 4 lb weight loss willing to try to increase the dose again for better efficacy.  Had not seen or call Dr. Turner Cobb for follow-up colon cancer colonoscopy nor Cardiology we will appointment that used to be in December had not heard from their appointment to due follow-up but agreed to call them at this time.             Past Medical History:  has a past medical history of COPD (chronic obstructive pulmonary disease), Insomnia, and Renal cyst.    Social History:  reports that she has been smoking cigarettes. She has been exposed to tobacco smoke. She has never used smokeless tobacco. She reports current alcohol use. She reports that she does not use drugs.    Care Team: Patient Care Team:  Jaclyn Casiano DNP as PCP - General (Family Medicine)  Iraida Kramer FNP as Nurse Practitioner (Cardiology)     Current Medications:  Current Outpatient Medications   Medication Instructions    albuterol (PROAIR HFA) 90 mcg/actuation inhaler 2 puffs, Inhalation, 3 times daily PRN, Rescue    atorvastatin (LIPITOR) 40 mg, Oral, Nightly    buPROPion (WELLBUTRIN XL) 150 mg, Oral, Daily    fluticasone-salmeterol diskus inhaler 250-50 mcg 1 puff, Inhalation, 2 times daily, Controller    gabapentin (NEURONTIN) 100 mg, Oral, 3 times daily PRN    ondansetron (ZOFRAN) 8 mg, Oral,  "Every 8 hours PRN    triamcinolone acetonide 0.1% (KENALOG) 0.1 % cream Topical (Top), 2 times daily, To rash    vitamin D (VITAMIN D3) 1,000 Units, Oral, Daily    WEGOVY 1 mg, Subcutaneous, Every 7 days, For weight loss       Review of Systems   Respiratory:  Negative for shortness of breath.    Cardiovascular:  Positive for claudication. Negative for chest pain, palpitations and leg swelling.   Gastrointestinal:  Negative for nausea.   Musculoskeletal:  Positive for back pain and leg pain.   Allergic/Immunologic: Positive for environmental allergies.   Hematological: Negative.    Psychiatric/Behavioral:  Positive for depressed mood and sleep disturbance.         Visit Vitals  /72 (BP Location: Right arm, Patient Position: Sitting)   Pulse (!) 117   Temp 97.2 °F (36.2 °C)   Resp 20   Ht 5' 1" (1.549 m)   Wt 77.1 kg (170 lb)   SpO2 95%   BMI 32.12 kg/m²       Physical Exam  Constitutional:       Appearance: Normal appearance.   HENT:      Head: Normocephalic.      Nose: Nose normal.      Mouth/Throat:      Mouth: Mucous membranes are dry.   Eyes:      Extraocular Movements: Extraocular movements intact.      Conjunctiva/sclera: Conjunctivae normal.   Cardiovascular:      Rate and Rhythm: Normal rate and regular rhythm.      Pulses: Normal pulses.      Heart sounds: Normal heart sounds.   Pulmonary:      Effort: Pulmonary effort is normal.      Breath sounds: Normal breath sounds.   Abdominal:      General: Bowel sounds are normal.      Palpations: Abdomen is soft.   Musculoskeletal:         General: Normal range of motion.      Cervical back: Normal range of motion.   Skin:     General: Skin is warm and dry.      Capillary Refill: Capillary refill takes less than 2 seconds.   Neurological:      General: No focal deficit present.      Mental Status: She is alert and oriented to person, place, and time.   Psychiatric:         Mood and Affect: Mood normal.          Labs Reviewed:  Chemistry:  Lab Results "   Component Value Date     08/08/2024    K 4.7 08/08/2024    BUN 21 (H) 08/08/2024    CREATININE 0.87 08/08/2024    EGFRNORACEVR 71 08/08/2024    GLUCOSE 99 08/08/2024    CALCIUM 9.9 08/08/2024    ALKPHOS 53 08/08/2024    LABPROT 7.4 08/08/2024    ALBUMIN 4.2 08/08/2024    AST 25 08/08/2024    ALT 16 08/08/2024    [id number] 20 (L) 08/08/2024    TSH 2.080 03/25/2024        Lab Results   Component Value Date    HGBA1C 5.5 03/25/2024        Hematology:  Lab Results   Component Value Date    WBC 5.19 08/08/2024    RBC 4.94 08/08/2024    HGB 15.4 08/08/2024    HCT 44.0 08/08/2024    MCV 89.1 08/08/2024    MCH 31.2 08/08/2024    MCHC 35.0 08/08/2024    RDW 13.2 08/08/2024     08/08/2024    MPV 12.3 08/08/2024       Lipid Panel:  Lab Results   Component Value Date    CHOL 127 08/08/2024    HDL 51 08/08/2024    LDLDIRECT 54.5 08/08/2024    TRIG 130 08/08/2024        Assessment & Plan:  1. Anxiety and depression  Assessment & Plan:  Off wellbutrin for past year, but has been feeling more sadness and taking care of a ill brother at this time.  Also feels the Wellbutrin may help her to quit smoking or at least decrease again.     Orders:  -     buPROPion (WELLBUTRIN XL) 150 MG TB24 tablet; Take 1 tablet (150 mg total) by mouth once daily.  Dispense: 30 tablet; Refill: 11    2. Pulmonary emphysema, unspecified emphysema type  Assessment & Plan:  No restriction with most recent PFT 8/24, using advair 250/50 twice daily and not needing albuterol. good response with albuterol. Symptoms improved but not needing albuterol.       3. Mixed hyperlipidemia  Assessment & Plan:  No restriction with most recent PFT 8/24, using advair 250/50 twice daily and not needing albuterol. good response with albuterol.       4. Personal history of colon cancer  Overview:  Two thousand nineteen colon cancer discovered with a colon resection.  She is due for a colonoscopy recheck in October.  Request Dr. Reyes Edwards.  Discussed  possibility of setting with an oncologist again since it has not been 5 years since her last evaluation with oncologist.    Assessment & Plan:  Due colonsocopy October 2024, Addy Cobb for colonoscpy but not done.       5. Renal cancer, left  Assessment & Plan:  In 2011 she was diagnosed with stage IV left kidney cancer and had it removed in has been with the oncologist but has since changed that. At this time she has an appointment with the oncologist that does try to come to [facility] but she is determined that she does not want any further maintenance or any additional evaluation or treatment of this at this time.       6. Vitamin D deficiency  Overview:  Vitamin D 50398 daily. Recheck vitamin D in 3 months.     Assessment & Plan:  Vitamin D 1000 daily. Will notify with results of lab draw when available. Continue current treatment until results available.         7. Bilateral leg cramps  Assessment & Plan:  Continues to report intermittent leg cramping and feeling heavy. Check cmp with mg. Will notify with results of lab draw when available. Continue current treatment until results available.       Orders:  -     semaglutide, weight loss, (WEGOVY) 1 mg/0.5 mL PnIj; Inject 1 mg into the skin every 7 days. For weight loss  Dispense: 4 mL; Refill: 1    8. Class 1 obesity due to excess calories with serious comorbidity and body mass index (BMI) of 32.0 to 32.9 in adult  Assessment & Plan:  Had tried wegovy 1 mg every 7 days but experienced nausea and unable to tolerate. Decreased wegovy to 0.5 mg every 7 days with zofran 8 mg prn every 8 hours. But increase to 1 mg again. Maintained only 4#weight loss and eating more vegetables in diet. The patient has been provided educational materials regarding the benefits of attaining and maintaining a normal weight.       9. Gastroesophageal reflux disease without esophagitis  Assessment & Plan:  Taking over the counter nexium and helping as needed. No symptoms with  treatment.       10. Tobacco abuse disorder  Assessment & Plan:  It is very important that she quit smoking. There are various alternatives available to help with this difficult task, but first and foremost, she must make a firm commitment and decision to quit. The nature of nicotine addiction is discussed. The usefulness of behavioral therapy is discussed and suggested.  The correct use, cost and side effects of nicotine replacement therapy such as gum or patches is discussed. Bupropion and its cost (sometimes not covered fully by insurance) and side effects are reviewed. The quit rates are discussed. I recommend she not allow potential costs of treatment to deter her from using nicotine replacement therapy or bupropion, as the long term economic and health benefits are obvious. Counseling 5 minutes.       11. Restless leg syndrome  Assessment & Plan:  Gabapentin 100 mg tid prn restless leg/lumbar pain. Begin with 100 mg but could increase to two tab if needed at night.     Orders:  -     gabapentin (NEURONTIN) 100 MG capsule; Take 1 capsule (100 mg total) by mouth 3 (three) times daily as needed (back pain and restless legs).  Dispense: 90 capsule; Refill: 1    12. Nausea  -     ondansetron (ZOFRAN) 8 MG tablet; Take 1 tablet (8 mg total) by mouth every 8 (eight) hours as needed for Nausea.  Dispense: 24 tablet; Refill: 1    13. Non compliance w medication regimen  Overview:  Upon follow-up with cardiologist she has been off her medications for the past 6 months without known cause it to restart these for safety and prevention of further complications by .  Wanting to take any medications other than the cholesterol with a baby aspirin..    Assessment & Plan:  Has not called to schedule appointment for follow up post colon cancer colonoscopy with Addy Cobb, phone number given for call. Also has not followed with cardiology. Still smoking and discussed need to quit smoking.            Assessment & Plan                Future Appointments   Date Time Provider Department Center   5/12/2025  7:00 AM Jaclyn Casiano DNP [facility]       Follow up in about 3 months (around 5/10/2025). Call sooner if needed.      This note was generated with the assistance of ambient listening technology. Verbal consent was obtained by the patient and accompanying visitor(s) for the recording of patient appointment to facilitate this note. I attest to having reviewed and edited the generated note for accuracy, though some syntax or spelling errors may persist. Please contact the author of this note for any clarification.      Jaclyn Casiano DNP    Lab Frequency Next Occurrence   X-Ray Chest PA And Lateral Once 08/08/2024   Ambulatory referral/consult to Family Practice Once 08/15/2024

## 2025-02-10 NOTE — ASSESSMENT & PLAN NOTE
Vitamin D 1000 daily. Will notify with results of lab draw when available. Continue current treatment until results available.

## 2025-02-11 ENCOUNTER — TELEPHONE (OUTPATIENT)
Dept: FAMILY MEDICINE | Facility: CLINIC | Age: 75
End: 2025-02-11
Payer: COMMERCIAL

## 2025-02-11 NOTE — TELEPHONE ENCOUNTER
----- Message from Jaclyn Casiano DNP sent at 2/10/2025  6:45 PM CST -----  Notify potassium is higher again remind of low-potassium diet also kidney function slight decrease increase water intake.  Vitamin-D has greatly improved from 20-65 no higher with vitamin-D supplementation.  Otherwise labs stable and therapeutic.  Send copy of labs to cardiologist for follow-up.  Recheck renal panel with urinalysis in 3 months.  Refilling continue atorvastatin and ensure only taking vitamin-D 1000 units daily.  Recheck CMP FLP and CBC in six-month

## 2025-02-11 NOTE — TELEPHONE ENCOUNTER
Patient notified. Schedule labs in 3 months for renal panel and urinalysis and 6 months for CMP, FLP, CBC. Labs faxed to Dr Jones.

## 2025-03-06 ENCOUNTER — TELEPHONE (OUTPATIENT)
Dept: FAMILY MEDICINE | Facility: CLINIC | Age: 75
End: 2025-03-06
Payer: COMMERCIAL

## 2025-03-06 DIAGNOSIS — G25.81 RESTLESS LEG SYNDROME: ICD-10-CM

## 2025-03-06 DIAGNOSIS — R25.2 BILATERAL LEG CRAMPS: ICD-10-CM

## 2025-03-06 RX ORDER — SEMAGLUTIDE 1 MG/.5ML
1 INJECTION, SOLUTION SUBCUTANEOUS
Qty: 4 ML | Refills: 1 | Status: SHIPPED | OUTPATIENT
Start: 2025-03-06

## 2025-03-06 RX ORDER — GABAPENTIN 100 MG/1
100 CAPSULE ORAL 3 TIMES DAILY PRN
Qty: 90 CAPSULE | Refills: 1 | Status: SHIPPED | OUTPATIENT
Start: 2025-03-06

## 2025-03-17 ENCOUNTER — TELEPHONE (OUTPATIENT)
Dept: FAMILY MEDICINE | Facility: CLINIC | Age: 75
End: 2025-03-17
Payer: COMMERCIAL

## 2025-03-17 NOTE — TELEPHONE ENCOUNTER
----- Message from Radha sent at 3/17/2025  9:20 AM CDT -----  Patient needs refill on Gabapentin 100 mg called in to [address]

## 2025-04-02 ENCOUNTER — TELEPHONE (OUTPATIENT)
Dept: FAMILY MEDICINE | Facility: CLINIC | Age: 75
End: 2025-04-02
Payer: COMMERCIAL

## 2025-04-02 DIAGNOSIS — M81.0 OSTEOPOROSIS, UNSPECIFIED OSTEOPOROSIS TYPE, UNSPECIFIED PATHOLOGICAL FRACTURE PRESENCE: ICD-10-CM

## 2025-04-02 DIAGNOSIS — R25.2 BILATERAL LEG CRAMPS: ICD-10-CM

## 2025-04-02 DIAGNOSIS — E78.2 MIXED HYPERLIPIDEMIA: ICD-10-CM

## 2025-04-02 DIAGNOSIS — F32.A ANXIETY AND DEPRESSION: ICD-10-CM

## 2025-04-02 DIAGNOSIS — F41.9 ANXIETY AND DEPRESSION: ICD-10-CM

## 2025-04-02 RX ORDER — ATORVASTATIN CALCIUM 40 MG/1
40 TABLET, FILM COATED ORAL NIGHTLY
Qty: 90 TABLET | Refills: 1 | Status: SHIPPED | OUTPATIENT
Start: 2025-04-02

## 2025-04-02 RX ORDER — ALENDRONATE SODIUM 70 MG/1
70 TABLET ORAL
Qty: 4 TABLET | Refills: 2 | Status: SHIPPED | OUTPATIENT
Start: 2025-04-02 | End: 2025-07-01

## 2025-04-02 RX ORDER — SEMAGLUTIDE 1 MG/.5ML
1 INJECTION, SOLUTION SUBCUTANEOUS
Qty: 4 ML | Refills: 1 | Status: SHIPPED | OUTPATIENT
Start: 2025-04-02

## 2025-04-02 RX ORDER — BUPROPION HYDROCHLORIDE 150 MG/1
150 TABLET ORAL DAILY
Qty: 30 TABLET | Refills: 11 | Status: SHIPPED | OUTPATIENT
Start: 2025-04-02 | End: 2026-04-02

## 2025-04-02 NOTE — TELEPHONE ENCOUNTER
----- Message from Edilma sent at 4/2/2025  9:04 AM CDT -----  Regarding: Med refill  Needs Wegovy,  Bupropion,  Atorvastatin,  [address]

## 2025-04-29 ENCOUNTER — TELEPHONE (OUTPATIENT)
Dept: FAMILY MEDICINE | Facility: CLINIC | Age: 75
End: 2025-04-29
Payer: COMMERCIAL

## 2025-04-29 DIAGNOSIS — G25.81 RESTLESS LEG SYNDROME: ICD-10-CM

## 2025-04-29 DIAGNOSIS — R25.2 BILATERAL LEG CRAMPS: ICD-10-CM

## 2025-04-29 RX ORDER — GABAPENTIN 100 MG/1
100 CAPSULE ORAL 3 TIMES DAILY PRN
Qty: 90 CAPSULE | Refills: 1 | Status: SHIPPED | OUTPATIENT
Start: 2025-04-29

## 2025-04-29 RX ORDER — SEMAGLUTIDE 1 MG/.5ML
1 INJECTION, SOLUTION SUBCUTANEOUS
Qty: 4 ML | Refills: 1 | Status: SHIPPED | OUTPATIENT
Start: 2025-04-29

## 2025-04-29 NOTE — TELEPHONE ENCOUNTER
----- Message from Edilma sent at 4/29/2025 12:33 PM CDT -----  Regarding: Med refill  Needs Gabapentin and Wegovy sent to [address]

## 2025-05-05 ENCOUNTER — TELEPHONE (OUTPATIENT)
Dept: FAMILY MEDICINE | Facility: CLINIC | Age: 75
End: 2025-05-05
Payer: COMMERCIAL

## 2025-05-05 DIAGNOSIS — F17.200 ENCOUNTER FOR SCREENING FOR MALIGNANT NEOPLASM OF LUNG IN CURRENT SMOKER WITH 30 PACK YEAR HISTORY OR GREATER: ICD-10-CM

## 2025-05-05 DIAGNOSIS — Z12.2 ENCOUNTER FOR SCREENING FOR MALIGNANT NEOPLASM OF LUNG IN CURRENT SMOKER WITH 30 PACK YEAR HISTORY OR GREATER: ICD-10-CM

## 2025-05-05 DIAGNOSIS — Z72.0 TOBACCO ABUSE DISORDER: Primary | ICD-10-CM

## 2025-05-05 DIAGNOSIS — Z87.891 SMOKER WITHIN LAST 12 MONTHS: ICD-10-CM

## 2025-05-16 ENCOUNTER — HOSPITAL ENCOUNTER (OUTPATIENT)
Dept: RADIOLOGY | Facility: HOSPITAL | Age: 75
Discharge: HOME | End: 2025-05-16
Attending: NURSE PRACTITIONER
Payer: COMMERCIAL

## 2025-05-16 DIAGNOSIS — Z87.891 SMOKER WITHIN LAST 12 MONTHS: ICD-10-CM

## 2025-05-16 PROCEDURE — 71271 CT THORAX LUNG CANCER SCR C-: CPT | Mod: TC

## 2025-05-26 ENCOUNTER — TELEPHONE (OUTPATIENT)
Dept: FAMILY MEDICINE | Facility: CLINIC | Age: 75
End: 2025-05-26
Payer: COMMERCIAL

## 2025-05-26 ENCOUNTER — RESULTS FOLLOW-UP (OUTPATIENT)
Dept: FAMILY MEDICINE | Facility: CLINIC | Age: 75
End: 2025-05-26
Payer: COMMERCIAL

## 2025-05-26 DIAGNOSIS — F41.9 ANXIETY AND DEPRESSION: Primary | ICD-10-CM

## 2025-05-26 DIAGNOSIS — F32.A ANXIETY AND DEPRESSION: Primary | ICD-10-CM

## 2025-05-26 DIAGNOSIS — M81.0 OSTEOPOROSIS, UNSPECIFIED OSTEOPOROSIS TYPE, UNSPECIFIED PATHOLOGICAL FRACTURE PRESENCE: ICD-10-CM

## 2025-05-26 RX ORDER — BUPROPION HYDROCHLORIDE 300 MG/1
300 TABLET ORAL DAILY
Qty: 30 TABLET | Refills: 2 | Status: SHIPPED | OUTPATIENT
Start: 2025-05-26 | End: 2025-05-27 | Stop reason: SDUPTHER

## 2025-05-26 RX ORDER — ALENDRONATE SODIUM 70 MG/1
70 TABLET ORAL
Qty: 4 TABLET | Refills: 2 | Status: SHIPPED | OUTPATIENT
Start: 2025-05-26 | End: 2025-05-27 | Stop reason: SDUPTHER

## 2025-05-26 NOTE — TELEPHONE ENCOUNTER
Patient notified. States she is willing to try to quit smoking. States she is currently on Wellbutrin 150 mg and it doesn't help. Stated that previously she was on the 300 mg and it helped in the beginning. Do you want to increase dosage of Wellbutrin or keep her on 150 mg?

## 2025-05-26 NOTE — TELEPHONE ENCOUNTER
----- Message from Jaclyn Casiano DNP sent at 5/26/2025  6:22 AM CDT -----  Notify scarring as with emphysema, no effusion. Screen with ct lung in 2 years. Needs to quit smoking ask if she's willing for help?  ----- Message -----  From: Interface, Rad Results In  Sent: 5/22/2025  12:10 PM CDT  To: Jaclyn Casiano DNP

## 2025-05-27 ENCOUNTER — TELEPHONE (OUTPATIENT)
Dept: FAMILY MEDICINE | Facility: CLINIC | Age: 75
End: 2025-05-27

## 2025-05-27 ENCOUNTER — OFFICE VISIT (OUTPATIENT)
Dept: FAMILY MEDICINE | Facility: CLINIC | Age: 75
End: 2025-05-27
Payer: COMMERCIAL

## 2025-05-27 VITALS
TEMPERATURE: 98 F | DIASTOLIC BLOOD PRESSURE: 82 MMHG | HEIGHT: 61 IN | HEART RATE: 114 BPM | SYSTOLIC BLOOD PRESSURE: 132 MMHG | BODY MASS INDEX: 32.1 KG/M2 | WEIGHT: 170 LBS | OXYGEN SATURATION: 97 %

## 2025-05-27 DIAGNOSIS — E66.811 CLASS 1 OBESITY DUE TO EXCESS CALORIES WITH SERIOUS COMORBIDITY IN ADULT, UNSPECIFIED BMI: ICD-10-CM

## 2025-05-27 DIAGNOSIS — J43.9 PULMONARY EMPHYSEMA, UNSPECIFIED EMPHYSEMA TYPE: ICD-10-CM

## 2025-05-27 DIAGNOSIS — E55.9 VITAMIN D DEFICIENCY: ICD-10-CM

## 2025-05-27 DIAGNOSIS — E66.09 CLASS 1 OBESITY DUE TO EXCESS CALORIES WITH SERIOUS COMORBIDITY IN ADULT, UNSPECIFIED BMI: ICD-10-CM

## 2025-05-27 DIAGNOSIS — F41.9 ANXIETY AND DEPRESSION: ICD-10-CM

## 2025-05-27 DIAGNOSIS — F32.A ANXIETY AND DEPRESSION: ICD-10-CM

## 2025-05-27 DIAGNOSIS — Z12.11 ENCOUNTER FOR SCREENING COLONOSCOPY: Primary | ICD-10-CM

## 2025-05-27 DIAGNOSIS — M81.0 OSTEOPOROSIS, UNSPECIFIED OSTEOPOROSIS TYPE, UNSPECIFIED PATHOLOGICAL FRACTURE PRESENCE: ICD-10-CM

## 2025-05-27 DIAGNOSIS — Z12.31 BREAST CANCER SCREENING BY MAMMOGRAM: ICD-10-CM

## 2025-05-27 DIAGNOSIS — M54.50 LUMBAR PAIN: ICD-10-CM

## 2025-05-27 DIAGNOSIS — Z85.038 PERSONAL HISTORY OF COLON CANCER: ICD-10-CM

## 2025-05-27 DIAGNOSIS — E87.5 HYPERKALEMIA: ICD-10-CM

## 2025-05-27 DIAGNOSIS — C64.2 RENAL CANCER, LEFT: ICD-10-CM

## 2025-05-27 DIAGNOSIS — K21.9 GASTROESOPHAGEAL REFLUX DISEASE WITHOUT ESOPHAGITIS: ICD-10-CM

## 2025-05-27 DIAGNOSIS — R11.0 NAUSEA: ICD-10-CM

## 2025-05-27 DIAGNOSIS — R73.01 IMPAIRED FASTING BLOOD SUGAR: ICD-10-CM

## 2025-05-27 DIAGNOSIS — G25.81 RESTLESS LEG SYNDROME: Primary | ICD-10-CM

## 2025-05-27 DIAGNOSIS — R25.2 BILATERAL LEG CRAMPS: ICD-10-CM

## 2025-05-27 DIAGNOSIS — K59.02 CONSTIPATION DUE TO OUTLET DYSFUNCTION: ICD-10-CM

## 2025-05-27 LAB
BILIRUB UR QL STRIP: NEGATIVE
GLUCOSE UR QL STRIP: NEGATIVE
KETONES UR QL STRIP: NEGATIVE
LEUKOCYTE ESTERASE UR QL STRIP: NEGATIVE
PH, POC UA: 6
POC BLOOD, URINE: NEGATIVE
POC NITRATES, URINE: NEGATIVE
PROT UR QL STRIP: NEGATIVE
SP GR UR STRIP: 1.01 (ref 1–1.03)
UROBILINOGEN UR STRIP-ACNC: 0.2 (ref 0.1–1.1)

## 2025-05-27 PROCEDURE — 1157F ADVNC CARE PLAN IN RCRD: CPT | Mod: ,,, | Performed by: NURSE PRACTITIONER

## 2025-05-27 PROCEDURE — 1160F RVW MEDS BY RX/DR IN RCRD: CPT | Mod: ,,, | Performed by: NURSE PRACTITIONER

## 2025-05-27 PROCEDURE — 3075F SYST BP GE 130 - 139MM HG: CPT | Mod: ,,, | Performed by: NURSE PRACTITIONER

## 2025-05-27 PROCEDURE — 3079F DIAST BP 80-89 MM HG: CPT | Mod: ,,, | Performed by: NURSE PRACTITIONER

## 2025-05-27 PROCEDURE — 81003 URINALYSIS AUTO W/O SCOPE: CPT | Mod: QW,RHCUB | Performed by: NURSE PRACTITIONER

## 2025-05-27 PROCEDURE — 99214 OFFICE O/P EST MOD 30 MIN: CPT | Mod: ,,, | Performed by: NURSE PRACTITIONER

## 2025-05-27 PROCEDURE — 82043 UR ALBUMIN QUANTITATIVE: CPT | Performed by: NURSE PRACTITIONER

## 2025-05-27 PROCEDURE — 3008F BODY MASS INDEX DOCD: CPT | Mod: ,,, | Performed by: NURSE PRACTITIONER

## 2025-05-27 PROCEDURE — 1159F MED LIST DOCD IN RCRD: CPT | Mod: ,,, | Performed by: NURSE PRACTITIONER

## 2025-05-27 PROCEDURE — 1101F PT FALLS ASSESS-DOCD LE1/YR: CPT | Mod: ,,, | Performed by: NURSE PRACTITIONER

## 2025-05-27 PROCEDURE — 3288F FALL RISK ASSESSMENT DOCD: CPT | Mod: ,,, | Performed by: NURSE PRACTITIONER

## 2025-05-27 RX ORDER — ALENDRONATE SODIUM 70 MG/1
70 TABLET ORAL
Qty: 4 TABLET | Refills: 2 | Status: SHIPPED | OUTPATIENT
Start: 2025-05-27 | End: 2025-08-25

## 2025-05-27 RX ORDER — DOCUSATE SODIUM 100 MG/1
100 CAPSULE, LIQUID FILLED ORAL 2 TIMES DAILY
Qty: 30 CAPSULE | Refills: 2 | Status: SHIPPED | OUTPATIENT
Start: 2025-05-27

## 2025-05-27 RX ORDER — GABAPENTIN 100 MG/1
100 CAPSULE ORAL 3 TIMES DAILY PRN
Qty: 90 CAPSULE | Refills: 1 | Status: SHIPPED | OUTPATIENT
Start: 2025-05-27

## 2025-05-27 RX ORDER — LIDOCAINE 50 MG/G
1 PATCH TOPICAL DAILY
Qty: 30 PATCH | Refills: 2 | Status: SHIPPED | OUTPATIENT
Start: 2025-05-27

## 2025-05-27 RX ORDER — BUPROPION HYDROCHLORIDE 300 MG/1
300 TABLET ORAL DAILY
Qty: 30 TABLET | Refills: 2 | Status: SHIPPED | OUTPATIENT
Start: 2025-05-27

## 2025-05-27 RX ORDER — SEMAGLUTIDE 2.4 MG/.75ML
2.4 INJECTION, SOLUTION SUBCUTANEOUS
Qty: 0.75 ML | Refills: 3 | Status: SHIPPED | OUTPATIENT
Start: 2025-05-27

## 2025-05-27 NOTE — ASSESSMENT & PLAN NOTE
Low potassium diet. Will notify with results of lab draw when available. Continue current treatment until results available.

## 2025-05-27 NOTE — ASSESSMENT & PLAN NOTE
Wellbutrin xl 300 mg daily. The current medical regimen is effective;  continue present plan and medications.

## 2025-05-27 NOTE — TELEPHONE ENCOUNTER
Patient came today, we sent referrals to Dr Lopez and Dr Cobb - patient expressed that she probably wouldn't go to these appts but we explained to her the importance of getting these appts scheduled and attending the appts. We gave her the numbers to both drs and asked her to please watch for phone calls from drs. Instructed pt to call us or them if they have not reached out in 2-3 weeks.

## 2025-05-27 NOTE — ASSESSMENT & PLAN NOTE
Refer to GYN (evaluate for cystocele and treatment if needed). Resend referral to Dr Cobb for colonoscopy. Reports needing more positioning changes for defication becoming more severe in past 2 months with increased ability to empty rectum with rectal pain.

## 2025-05-27 NOTE — PROGRESS NOTES
Patient ID: Natalia Weller  : 1950     Chief Complaint: Follow-up (Follow up. Patient is not fasting today. Will give urine sample before leaving today /Refused mammogram at this time. )    Allergies: Patient is allergic to adhesive.     History of Present Illness:  The patient is a 74 y.o. White female who presents to clinic for evaluation and management with a chief complaint of Follow-up (Follow up. Patient is not fasting today. Will give urine sample before leaving today /Refused mammogram at this time. )   History of Present Illness               Past Medical History:  has a past medical history of COPD (chronic obstructive pulmonary disease), Insomnia, and Renal cyst.    Social History:  reports that she has been smoking cigarettes. She started smoking about 56 years ago. She has a 112.1 pack-year smoking history. She has been exposed to tobacco smoke. She has never used smokeless tobacco. She reports current alcohol use. She reports that she does not use drugs.    Care Team: Patient Care Team:  Jaclyn Casiano DNP as PCP - General (Family Medicine)  Iraida Kramer FNP as Nurse Practitioner (Cardiology)     Current Medications:  Current Outpatient Medications   Medication Instructions    albuterol (PROAIR HFA) 90 mcg/actuation inhaler 2 puffs, Inhalation, 3 times daily PRN, Rescue    alendronate (FOSAMAX) 70 mg, Oral, Every 7 days    atorvastatin (LIPITOR) 40 mg, Oral, Nightly    buPROPion (WELLBUTRIN XL) 300 mg, Oral, Daily    fluticasone-salmeterol diskus inhaler 250-50 mcg 1 puff, Inhalation, 2 times daily, Controller    gabapentin (NEURONTIN) 100 mg, Oral, 3 times daily PRN    ondansetron (ZOFRAN) 8 mg, Oral, Every 8 hours PRN    triamcinolone acetonide 0.1% (KENALOG) 0.1 % cream Topical (Top), 2 times daily, To rash    vitamin D (VITAMIN D3) 1,000 Units, Oral, Daily    WEGOVY 1 mg, Subcutaneous, Every 7 days, For weight loss       Review of Systems     Visit Vitals  /82 (BP Location: Left  "arm, Patient Position: Sitting)   Pulse (!) 114   Temp 97.9 °F (36.6 °C)   Ht 5' 1" (1.549 m)   Wt 77.1 kg (170 lb)   SpO2 97%   BMI 32.12 kg/m²       Physical Exam     Labs Reviewed:  Chemistry:  Lab Results   Component Value Date     02/10/2025    K 5.4 (H) 02/10/2025    BUN 21 (H) 02/10/2025    CREATININE 0.95 02/10/2025    EGFRNORACEVR 64 02/10/2025    CALCIUM 10.3 (H) 02/10/2025    ALKPHOS 53 02/10/2025    ALBUMIN 4.6 02/10/2025    AST 22 02/10/2025    ALT 17 02/10/2025    MG 2.10 02/10/2025    [id number] 65 02/10/2025    TSH 2.080 03/25/2024        Lab Results   Component Value Date    HGBA1C 5.5 03/25/2024        Hematology:  Lab Results   Component Value Date    WBC 5.71 02/10/2025    RBC 4.86 02/10/2025    HGB 14.9 02/10/2025    HCT 43.4 02/10/2025    MCV 89.3 02/10/2025    MCH 30.7 02/10/2025    MCHC 34.3 02/10/2025    RDW 13.4 02/10/2025     02/10/2025    MPV 12.4 02/10/2025       Lipid Panel:  Lab Results   Component Value Date    CHOL 140 02/10/2025    HDL 51 02/10/2025    LDLDIRECT 63.2 02/10/2025    TRIG 113 02/10/2025        Assessment & Plan:  1. Restless leg syndrome  Assessment & Plan:  Gabapentin 100 mg tid prn restless leg/lumbar pain. Begin with 100 mg but could increase to two tab if needed at night.       2. Pulmonary emphysema, unspecified emphysema type  Assessment & Plan:  No restriction with most recent PFT 8/24, using advair 250/50 twice daily and not needing albuterol. good response with albuterol. The current medical regimen is effective;  continue present plan and medications.        3. Personal history of colon cancer  Overview:  Two thousand nineteen colon cancer discovered with a colon resection.  She is due for a colonoscopy recheck in October.  Request Dr. Reyes Edwards.  Discussed possibility of setting with an oncologist again since it has not been 5 years since her last evaluation with oncologist.    Assessment & Plan:  , diagnosed colon cancer with colon " resection. Colonsocopy October 2024, Addy Cobb for colonoscpy, [facility]      4. Hyperkalemia  Assessment & Plan:  Low potassium diet. Will notify with results of lab draw when available. Continue current treatment until results available.        5. Renal cancer, left  Assessment & Plan:  In 2011 she was diagnosed with stage IV left kidney cancer. Urine cytology, declines oncology or urology referral for follow up. Denies hematuria.     Orders:  -     POCT Urinalysis, Dipstick, Automated, W/O Scope    6. Vitamin D deficiency  Overview:  Vitamin D 69819 daily. Recheck vitamin D in 3 months.     Assessment & Plan:  Vitamin D 1000 daily. Will notify with results of lab draw when available. Continue current treatment until results available.       7. Impaired fasting blood sugar  Assessment & Plan:  Check Hgb A1c, follow low carbohydrate diet. Will notify with results of lab draw when available. Continue current treatment until results available.         8. Anxiety and depression  Assessment & Plan:  Wellbutrin xl 300 mg daily. The current medical regimen is effective;  continue present plan and medications.        9. Gastroesophageal reflux disease without esophagitis  Assessment & Plan:  Taking over the counter nexium and helping as needed. No symptoms with treatment.            Assessment & Plan               Future Appointments   Date Time Provider Department Center   8/11/2025  8:20 AM NURSE, [facility] FAMILY [facility] [place]       No follow-ups on file. Call sooner if needed.      This note was generated with the assistance of ambient listening technology. Verbal consent was obtained by the patient and accompanying visitor(s) for the recording of patient appointment to facilitate this note. I attest to having reviewed and edited the generated note for accuracy, though some syntax or spelling errors may persist. Please contact the author of this note for any clarification.      Deborah Sterling MA    Lab  Frequency Next Occurrence   X-Ray Chest PA And Lateral Once 08/08/2024   Ambulatory referral/consult to Family Practice Once 08/15/2024

## 2025-05-27 NOTE — ASSESSMENT & PLAN NOTE
No restriction with most recent PFT 8/24, using advair 250/50 twice daily and not needing albuterol. good response with albuterol. The current medical regimen is effective;  continue present plan and medications.

## 2025-05-27 NOTE — ASSESSMENT & PLAN NOTE
In 2011 she was diagnosed with stage IV left kidney cancer. Urine cytology, declines oncology or urology referral for follow up. Denies hematuria.

## 2025-05-27 NOTE — ASSESSMENT & PLAN NOTE
, diagnosed colon cancer with colon resection. Colonsocopy October 2024, Addy Cobb for colonoscpy, [facility]

## 2025-05-27 NOTE — ASSESSMENT & PLAN NOTE
Check Hgb A1c, follow low carbohydrate diet. Will notify with results of lab draw when available. Continue current treatment until results available.

## 2025-05-27 NOTE — ASSESSMENT & PLAN NOTE
Increased lumbar pain, unable to tolerate tylenol with restless leg worsening and of pain radiculopathy to back and legs. Lidocaine patch daily with gabapentin 100 mg tid.

## 2025-05-27 NOTE — PROGRESS NOTES
Patient ID: Natalia Weller  : 1950     Chief Complaint: Follow-up (Follow up. Patient is not fasting today. Will give urine sample before leaving today /Refused mammogram at this time. )    Allergies: Patient is allergic to adhesive.     History of Present Illness:  The patient is a 74 y.o. White female who presents to clinic for evaluation and management with a chief complaint of Follow-up (Follow up. Patient is not fasting today. Will give urine sample before leaving today /Refused mammogram at this time. )   Follow-up  Associated symptoms include abdominal pain (left upper quadrant intermittently. also change in bowel patterns.), arthralgias and a change in bowel habit (needing more help with decreased BM from daily to every 3 days and not emptying well. alfo finding hard stool with changing position on toilet to deficate in past two months.). Pertinent negatives include no chest pain, congestion, headaches, nausea, vertigo or vomiting.      Did not hear back from the referral for the colonoscopy from last visit in February but is willing to do it and has noticed a change in her bowel patterns in the last 2 months has been having to actually push the rectal area to the side to be able to enable a bowel movement.  No blood in his stool but increased constipation and trouble emptying bowels since then..  Has not had a gyn appointment and since her hysterectomy with Dr. Daryl Lundberg.  Past Medical History:  has a past medical history of COPD (chronic obstructive pulmonary disease), Insomnia, and Renal cyst.    Social History:  reports that she has been smoking cigarettes. She started smoking about 56 years ago. She has a 112.1 pack-year smoking history. She has been exposed to tobacco smoke. She has never used smokeless tobacco. She reports current alcohol use. She reports that she does not use drugs.    Care Team: Patient Care Team:  Jaclyn Casiano DNP as PCP - General (Family Medicine)  Iraida Kramer, LANA  as Nurse Practitioner (Cardiology)     Current Medications:  Current Outpatient Medications   Medication Instructions    albuterol (PROAIR HFA) 90 mcg/actuation inhaler 2 puffs, Inhalation, 3 times daily PRN, Rescue    alendronate (FOSAMAX) 70 mg, Oral, Every 7 days    atorvastatin (LIPITOR) 40 mg, Oral, Nightly    buPROPion (WELLBUTRIN XL) 300 mg, Oral, Daily    docusate sodium (COLACE) 100 mg, Oral, 2 times daily    fluticasone-salmeterol diskus inhaler 250-50 mcg 1 puff, Inhalation, 2 times daily, Controller    gabapentin (NEURONTIN) 100 mg, Oral, 3 times daily PRN    LIDOcaine (LIDODERM) 5 % 1 patch, Transdermal, Daily, Remove & Discard patch within 12 hours or as directed    ondansetron (ZOFRAN) 8 mg, Oral, Every 8 hours PRN    triamcinolone acetonide 0.1% (KENALOG) 0.1 % cream Topical (Top), 2 times daily, To rash    vitamin D (VITAMIN D3) 1,000 Units, Oral, Daily    WEGOVY 2.4 mg, Subcutaneous, Every 7 days       Review of Systems   Constitutional:  Negative for activity change and appetite change.   HENT:  Negative for nasal congestion, trouble swallowing and voice change.    Eyes: Negative.  Negative for visual disturbance.   Respiratory: Negative.  Negative for chest tightness, shortness of breath and wheezing.    Cardiovascular:  Negative for chest pain, palpitations and leg swelling.   Gastrointestinal:  Positive for abdominal pain (left upper quadrant intermittently. also change in bowel patterns.), change in bowel habit (needing more help with decreased BM from daily to every 3 days and not emptying well. alfo finding hard stool with changing position on toilet to deficate in past two months.), constipation and rectal pain. Negative for anal bleeding, blood in stool, nausea, vomiting, reflux and fecal incontinence.   Endocrine: Negative for cold intolerance, heat intolerance and polyuria.   Genitourinary:  Negative for bladder incontinence, difficulty urinating, flank pain and frequency.  "  Musculoskeletal:  Positive for arthralgias.   Integumentary:  Negative.   Allergic/Immunologic: Negative for environmental allergies and food allergies.   Neurological:  Negative for vertigo, tremors, seizures, syncope, light-headedness, headaches and coordination difficulties.   Hematological:  Negative for adenopathy. Does not bruise/bleed easily.   Psychiatric/Behavioral:  Negative for agitation, dysphoric mood, sleep disturbance and suicidal ideas. The patient is not nervous/anxious.         Visit Vitals  /82 (BP Location: Left arm, Patient Position: Sitting)   Pulse (!) 114   Temp 97.9 °F (36.6 °C)   Ht 5' 1" (1.549 m)   Wt 77.1 kg (170 lb)   SpO2 97%   BMI 32.12 kg/m²       Physical Exam  Vitals and nursing note reviewed.   Constitutional:       General: She is not in acute distress.     Appearance: She is not ill-appearing.   HENT:      Head: Normocephalic and atraumatic.      Mouth/Throat:      Mouth: Mucous membranes are moist.      Pharynx: Oropharynx is clear.   Eyes:      General: No scleral icterus.     Extraocular Movements: Extraocular movements intact.      Conjunctiva/sclera: Conjunctivae normal.      Pupils: Pupils are equal, round, and reactive to light.   Neck:      Vascular: No carotid bruit.   Cardiovascular:      Rate and Rhythm: Normal rate and regular rhythm.      Pulses: Normal pulses.      Heart sounds: Normal heart sounds. No murmur heard.     No friction rub. No gallop.   Pulmonary:      Effort: Pulmonary effort is normal. No respiratory distress.      Breath sounds: Normal breath sounds. No wheezing, rhonchi or rales.   Chest:   Breasts:     Right: No swelling, bleeding, inverted nipple, nipple discharge or tenderness.      Left: No swelling, bleeding, inverted nipple, nipple discharge or tenderness.       Abdominal:      General: Abdomen is flat. Bowel sounds are normal. There is no distension.      Palpations: Abdomen is soft. There is no mass.      Tenderness: There is no " abdominal tenderness.   Musculoskeletal:         General: Normal range of motion.      Cervical back: Normal range of motion and neck supple.   Skin:     General: Skin is warm and dry.   Neurological:      General: No focal deficit present.      Mental Status: She is alert and oriented to person, place, and time.   Psychiatric:         Mood and Affect: Mood normal.          Labs Reviewed:  Chemistry:  Lab Results   Component Value Date     02/10/2025    K 5.4 (H) 02/10/2025    BUN 21 (H) 02/10/2025    CREATININE 0.95 02/10/2025    EGFRNORACEVR 64 02/10/2025    CALCIUM 10.3 (H) 02/10/2025    ALKPHOS 53 02/10/2025    ALBUMIN 4.6 02/10/2025    AST 22 02/10/2025    ALT 17 02/10/2025    MG 2.10 02/10/2025    IUXHGORC68YN 65 02/10/2025    TSH 2.080 03/25/2024        Lab Results   Component Value Date    HGBA1C 5.5 03/25/2024        Hematology:  Lab Results   Component Value Date    WBC 5.71 02/10/2025    RBC 4.86 02/10/2025    HGB 14.9 02/10/2025    HCT 43.4 02/10/2025    MCV 89.3 02/10/2025    MCH 30.7 02/10/2025    MCHC 34.3 02/10/2025    RDW 13.4 02/10/2025     02/10/2025    MPV 12.4 02/10/2025       Lipid Panel:  Lab Results   Component Value Date    CHOL 140 02/10/2025    HDL 51 02/10/2025    LDLDIRECT 63.2 02/10/2025    TRIG 113 02/10/2025        Assessment & Plan:  1. Restless leg syndrome  Assessment & Plan:  Gabapentin 100 mg tid prn restless leg/lumbar pain. Begin with 100 mg but could increase to two tab if needed at night.     Orders:  -     gabapentin (NEURONTIN) 100 MG capsule; Take 1 capsule (100 mg total) by mouth 3 (three) times daily as needed (back pain and restless legs).  Dispense: 90 capsule; Refill: 1    2. Pulmonary emphysema, unspecified emphysema type  Assessment & Plan:  No restriction with most recent PFT 8/24, using advair 250/50 twice daily and not needing albuterol. good response with albuterol. The current medical regimen is effective;  continue present plan and  medications.        3. Personal history of colon cancer  Overview:  Two thousand nineteen colon cancer discovered with a colon resection.  She is due for a colonoscopy recheck in October.  Request Dr. Reyes Edwards.  Discussed possibility of setting with an oncologist again since it has not been 5 years since her last evaluation with oncologist.    Assessment & Plan:  , diagnosed colon cancer with colon resection. Colonsocopy October 2024, Addy Cobb for colonoscpy, CEA      4. Hyperkalemia  Assessment & Plan:  Low potassium diet. Will notify with results of lab draw when available. Continue current treatment until results available.        5. Renal cancer, left  Assessment & Plan:  In 2011 she was diagnosed with stage IV left kidney cancer. Urine cytology, declines oncology or urology referral for follow up. Denies hematuria.     Orders:  -     POCT Urinalysis, Dipstick, Automated, W/O Scope    6. Vitamin D deficiency  Overview:  Vitamin D 95970 daily. Recheck vitamin D in 3 months.     Assessment & Plan:  Vitamin D 1000 daily. Will notify with results of lab draw when available. Continue current treatment until results available.       7. Impaired fasting blood sugar  Assessment & Plan:  Check Hgb A1c, follow low carbohydrate diet. Will notify with results of lab draw when available. Continue current treatment until results available.         8. Anxiety and depression  Assessment & Plan:  Wellbutrin xl 300 mg daily. The current medical regimen is effective;  continue present plan and medications.      Orders:  -     buPROPion (WELLBUTRIN XL) 300 MG 24 hr tablet; Take 1 tablet (300 mg total) by mouth once daily.  Dispense: 30 tablet; Refill: 2    9. Gastroesophageal reflux disease without esophagitis  Assessment & Plan:  Taking over the counter nexium and helping as needed. No symptoms with treatment.       10. Constipation due to outlet dysfunction  Assessment & Plan:  Refer to GYN (evaluate for cystocele  and treatment if needed). Resend referral to Dr Cobb for colonoscopy. Reports needing more positioning changes for defication becoming more severe in past 2 months with increased ability to empty rectum with rectal pain.     Orders:  -     Ambulatory referral/consult to Gynecology; Future; Expected date: 06/03/2025    11. Lumbar pain  Assessment & Plan:  Increased lumbar pain, unable to tolerate tylenol with restless leg worsening and of pain radiculopathy to back and legs. Lidocaine patch daily with gabapentin 100 mg tid.    Orders:  -     LIDOcaine (LIDODERM) 5 %; Place 1 patch onto the skin once daily. Remove & Discard patch within 12 hours or as directed  Dispense: 30 patch; Refill: 2    12. Nausea    13. Bilateral leg cramps    14. Osteoporosis, unspecified osteoporosis type, unspecified pathological fracture presence  -     alendronate (FOSAMAX) 70 MG tablet; Take 1 tablet (70 mg total) by mouth every 7 days.  Dispense: 4 tablet; Refill: 2    15. Class 1 obesity due to excess calories with serious comorbidity in adult, unspecified BMI  -     semaglutide, weight loss, (WEGOVY) 2.4 mg/0.75 mL PnIj; Inject 2.4 mg into the skin every 7 days.  Dispense: 0.75 mL; Refill: 3    Other orders  -     docusate sodium (COLACE) 100 MG capsule; Take 1 capsule (100 mg total) by mouth 2 (two) times daily.  Dispense: 30 capsule; Refill: 2         Future Appointments   Date Time Provider Department Center   6/2/2025  7:35 AM NURSE, [facility] FAMILY MEDICINE [facility] MARIELLA Reyes       Follow up in about 3 months (around 8/27/2025). Call sooner if needed.    Jaclyn Casiano, DORIS    Lab Frequency Next Occurrence   X-Ray Chest PA And Lateral Once 08/08/2024   Ambulatory referral/consult to Family Practice Once 08/15/2024

## 2025-05-28 ENCOUNTER — RESULTS FOLLOW-UP (OUTPATIENT)
Dept: FAMILY MEDICINE | Facility: CLINIC | Age: 75
End: 2025-05-28

## 2025-05-28 LAB
CREAT UR-MCNC: 83.3 MG/DL (ref 45–106)
MICROALBUMIN UR-MCNC: <5 UG/ML
MICROALBUMIN/CREAT RATIO PNL UR: NORMAL

## 2025-05-30 ENCOUNTER — HOSPITAL ENCOUNTER (OUTPATIENT)
Dept: RADIOLOGY | Facility: HOSPITAL | Age: 75
Discharge: HOME | End: 2025-05-30
Attending: NURSE PRACTITIONER
Payer: COMMERCIAL

## 2025-05-30 DIAGNOSIS — Z12.31 BREAST CANCER SCREENING BY MAMMOGRAM: ICD-10-CM

## 2025-05-30 PROCEDURE — 77067 SCR MAMMO BI INCL CAD: CPT | Mod: 26,,, | Performed by: STUDENT IN AN ORGANIZED HEALTH CARE EDUCATION/TRAINING PROGRAM

## 2025-05-30 PROCEDURE — 77067 SCR MAMMO BI INCL CAD: CPT | Mod: TC

## 2025-05-30 PROCEDURE — 77063 BREAST TOMOSYNTHESIS BI: CPT | Mod: 26,,, | Performed by: STUDENT IN AN ORGANIZED HEALTH CARE EDUCATION/TRAINING PROGRAM

## 2025-06-02 PROCEDURE — 82378 CARCINOEMBRYONIC ANTIGEN: CPT | Performed by: NURSE PRACTITIONER

## 2025-06-02 PROCEDURE — 83036 HEMOGLOBIN GLYCOSYLATED A1C: CPT | Performed by: NURSE PRACTITIONER

## 2025-06-02 PROCEDURE — 80053 COMPREHEN METABOLIC PANEL: CPT | Performed by: NURSE PRACTITIONER

## 2025-06-02 PROCEDURE — 80061 LIPID PANEL: CPT | Performed by: NURSE PRACTITIONER

## 2025-06-02 PROCEDURE — 82306 VITAMIN D 25 HYDROXY: CPT | Performed by: NURSE PRACTITIONER

## 2025-06-02 PROCEDURE — 84443 ASSAY THYROID STIM HORMONE: CPT | Performed by: NURSE PRACTITIONER

## 2025-06-02 PROCEDURE — 85025 COMPLETE CBC W/AUTO DIFF WBC: CPT | Performed by: NURSE PRACTITIONER

## 2025-06-03 ENCOUNTER — RESULTS FOLLOW-UP (OUTPATIENT)
Dept: FAMILY MEDICINE | Facility: CLINIC | Age: 75
End: 2025-06-03
Payer: COMMERCIAL

## 2025-06-03 ENCOUNTER — TELEPHONE (OUTPATIENT)
Dept: FAMILY MEDICINE | Facility: CLINIC | Age: 75
End: 2025-06-03
Payer: COMMERCIAL

## 2025-06-03 DIAGNOSIS — G25.81 RESTLESS LEG SYNDROME: ICD-10-CM

## 2025-06-03 RX ORDER — GABAPENTIN 100 MG/1
100 CAPSULE ORAL 3 TIMES DAILY PRN
Qty: 90 CAPSULE | Refills: 1 | Status: SHIPPED | OUTPATIENT
Start: 2025-06-03

## 2025-06-05 ENCOUNTER — TELEPHONE (OUTPATIENT)
Dept: FAMILY MEDICINE | Facility: CLINIC | Age: 75
End: 2025-06-05
Payer: COMMERCIAL

## 2025-06-09 ENCOUNTER — TELEPHONE (OUTPATIENT)
Dept: FAMILY MEDICINE | Facility: CLINIC | Age: 75
End: 2025-06-09
Payer: COMMERCIAL

## 2025-06-09 NOTE — TELEPHONE ENCOUNTER
----- Message from Jaclyn Casiano DNP sent at 6/9/2025  6:56 AM CDT -----  Normal mammogram, reschedule in one year for breast exam and re screening mammogram unless changes  ----- Message -----  From: Interface, Rad Results In  Sent: 6/6/2025  10:47 AM CDT  To: Jaclyn Casiano DNP

## 2025-07-15 ENCOUNTER — TELEPHONE (OUTPATIENT)
Dept: FAMILY MEDICINE | Facility: CLINIC | Age: 75
End: 2025-07-15
Payer: COMMERCIAL

## 2025-07-15 DIAGNOSIS — G25.81 RESTLESS LEG SYNDROME: ICD-10-CM

## 2025-07-15 DIAGNOSIS — M81.0 OSTEOPOROSIS, UNSPECIFIED OSTEOPOROSIS TYPE, UNSPECIFIED PATHOLOGICAL FRACTURE PRESENCE: ICD-10-CM

## 2025-07-15 DIAGNOSIS — F41.9 ANXIETY AND DEPRESSION: ICD-10-CM

## 2025-07-15 DIAGNOSIS — F32.A ANXIETY AND DEPRESSION: ICD-10-CM

## 2025-07-15 DIAGNOSIS — E78.2 MIXED HYPERLIPIDEMIA: ICD-10-CM

## 2025-07-15 DIAGNOSIS — K59.02 CONSTIPATION DUE TO OUTLET DYSFUNCTION: Primary | ICD-10-CM

## 2025-07-15 RX ORDER — BUPROPION HYDROCHLORIDE 300 MG/1
300 TABLET ORAL DAILY
Qty: 90 TABLET | Refills: 0 | Status: SHIPPED | OUTPATIENT
Start: 2025-07-15

## 2025-07-15 RX ORDER — ALENDRONATE SODIUM 70 MG/1
70 TABLET ORAL
Qty: 12 TABLET | Refills: 0 | Status: SHIPPED | OUTPATIENT
Start: 2025-07-15 | End: 2025-10-13

## 2025-07-15 RX ORDER — GABAPENTIN 100 MG/1
100 CAPSULE ORAL 3 TIMES DAILY PRN
Qty: 90 CAPSULE | Refills: 1 | Status: SHIPPED | OUTPATIENT
Start: 2025-07-15

## 2025-07-15 RX ORDER — ATORVASTATIN CALCIUM 40 MG/1
40 TABLET, FILM COATED ORAL NIGHTLY
Qty: 90 TABLET | Refills: 1 | Status: SHIPPED | OUTPATIENT
Start: 2025-07-15

## 2025-07-15 RX ORDER — DOCUSATE SODIUM 100 MG/1
100 CAPSULE, LIQUID FILLED ORAL 2 TIMES DAILY
Qty: 180 CAPSULE | Refills: 0 | Status: SHIPPED | OUTPATIENT
Start: 2025-07-15

## 2025-07-15 NOTE — TELEPHONE ENCOUNTER
Meds refilled. Patient states Zofran does not help while on Wegovy. Is there something else for her to try?

## 2025-07-21 ENCOUNTER — OFFICE VISIT (OUTPATIENT)
Dept: FAMILY MEDICINE | Facility: CLINIC | Age: 75
End: 2025-07-21
Payer: COMMERCIAL

## 2025-07-21 VITALS
HEIGHT: 61 IN | SYSTOLIC BLOOD PRESSURE: 150 MMHG | DIASTOLIC BLOOD PRESSURE: 82 MMHG | HEART RATE: 105 BPM | BODY MASS INDEX: 31.72 KG/M2 | OXYGEN SATURATION: 97 % | WEIGHT: 168 LBS | TEMPERATURE: 98 F

## 2025-07-21 DIAGNOSIS — R25.2 BILATERAL LEG CRAMPS: ICD-10-CM

## 2025-07-21 DIAGNOSIS — F41.9 ANXIETY AND DEPRESSION: ICD-10-CM

## 2025-07-21 DIAGNOSIS — R11.0 NAUSEA: ICD-10-CM

## 2025-07-21 DIAGNOSIS — Z72.0 TOBACCO ABUSE DISORDER: ICD-10-CM

## 2025-07-21 DIAGNOSIS — R11.0 NAUSEA IN ADULT: Primary | ICD-10-CM

## 2025-07-21 DIAGNOSIS — Z85.038 PERSONAL HISTORY OF COLON CANCER: ICD-10-CM

## 2025-07-21 DIAGNOSIS — F32.A ANXIETY AND DEPRESSION: ICD-10-CM

## 2025-07-21 DIAGNOSIS — G25.81 RESTLESS LEG SYNDROME: ICD-10-CM

## 2025-07-21 DIAGNOSIS — R03.0 ELEVATED BLOOD PRESSURE READING IN OFFICE WITH WHITE COAT SYNDROME, WITHOUT DIAGNOSIS OF HYPERTENSION: ICD-10-CM

## 2025-07-21 DIAGNOSIS — M54.50 LUMBAR PAIN: ICD-10-CM

## 2025-07-21 PROCEDURE — 1157F ADVNC CARE PLAN IN RCRD: CPT | Mod: ,,, | Performed by: NURSE PRACTITIONER

## 2025-07-21 PROCEDURE — 3077F SYST BP >= 140 MM HG: CPT | Mod: ,,, | Performed by: NURSE PRACTITIONER

## 2025-07-21 PROCEDURE — 3008F BODY MASS INDEX DOCD: CPT | Mod: ,,, | Performed by: NURSE PRACTITIONER

## 2025-07-21 PROCEDURE — 3079F DIAST BP 80-89 MM HG: CPT | Mod: ,,, | Performed by: NURSE PRACTITIONER

## 2025-07-21 PROCEDURE — 1159F MED LIST DOCD IN RCRD: CPT | Mod: ,,, | Performed by: NURSE PRACTITIONER

## 2025-07-21 PROCEDURE — 1160F RVW MEDS BY RX/DR IN RCRD: CPT | Mod: ,,, | Performed by: NURSE PRACTITIONER

## 2025-07-21 PROCEDURE — 3044F HG A1C LEVEL LT 7.0%: CPT | Mod: ,,, | Performed by: NURSE PRACTITIONER

## 2025-07-21 PROCEDURE — 99214 OFFICE O/P EST MOD 30 MIN: CPT | Mod: ,,, | Performed by: NURSE PRACTITIONER

## 2025-07-21 RX ORDER — SEMAGLUTIDE 1.7 MG/.75ML
1.7 INJECTION, SOLUTION SUBCUTANEOUS
Qty: 4 ML | Refills: 4 | Status: SHIPPED | OUTPATIENT
Start: 2025-07-21

## 2025-07-21 RX ORDER — LIDOCAINE 50 MG/G
1 PATCH TOPICAL DAILY
Qty: 30 PATCH | Refills: 2 | Status: SHIPPED | OUTPATIENT
Start: 2025-07-21

## 2025-07-21 RX ORDER — ONDANSETRON 8 MG/1
8 TABLET, FILM COATED ORAL EVERY 8 HOURS PRN
Qty: 24 TABLET | Refills: 1 | Status: SHIPPED | OUTPATIENT
Start: 2025-07-21

## 2025-07-21 NOTE — ASSESSMENT & PLAN NOTE
colonoscopy from last visit in February but is willing to do it and has noticed a change in her bowel patterns in the last 2 months has been having to actually push the rectal area to the side to be able to enable a bowel movement.

## 2025-07-21 NOTE — PROGRESS NOTES
Patient ID: Natalia Weller  : 1950     Chief Complaint: Nausea (Major nausea with wegovy. )    Allergies: Patient is allergic to adhesive.     History of Present Illness:  The patient is a 75 y.o. White female who presents to clinic for evaluation and management with a chief complaint of Nausea (Major nausea with wegovy. )   History of Present Illness : Patient is here today with complaints of ongoing nausea with her wegovy. She stated she has taken zofran for it and it is not working. She has a history of colon cancer but has tried to schedule colonoscopy twice and then never called again., reports now bowels moving well without blood in stool. Discussed importance of having screening colonoscopy for surveillance. More smoking in past two weeks with grieving for passing of brother. Wellbutrin usually helps, and was just sad for time. Doesn't desire extra for smoking cessation. Seeing GYN on 25, still with lumbar pain. Leg pain is improved. Resting without spaghetti feeling in legs.              Past Medical History:  has a past medical history of COPD (chronic obstructive pulmonary disease), Insomnia, and Renal cyst.    Social History:  reports that she has been smoking cigarettes. She started smoking about 56 years ago. She has a 112.4 pack-year smoking history. She has been exposed to tobacco smoke. She has never used smokeless tobacco. She reports current alcohol use. She reports that she does not use drugs.    Care Team: Patient Care Team:  Jaclyn Casiano DNP as PCP - General (Family Medicine)  Iraida Kramer FNP as Nurse Practitioner (Cardiology)     Current Medications:  Current Outpatient Medications   Medication Instructions    albuterol (PROAIR HFA) 90 mcg/actuation inhaler 2 puffs, Inhalation, 3 times daily PRN, Rescue    alendronate (FOSAMAX) 70 mg, Oral, Every 7 days    atorvastatin (LIPITOR) 40 mg, Oral, Nightly    buPROPion (WELLBUTRIN XL) 300 mg, Oral, Daily    docusate sodium (COLACE)  "100 mg, Oral, 2 times daily    fluticasone-salmeterol diskus inhaler 250-50 mcg 1 puff, Inhalation, 2 times daily, Controller    gabapentin (NEURONTIN) 100 mg, Oral, 3 times daily PRN    LIDOcaine (LIDODERM) 5 % 1 patch, Transdermal, Daily, Remove & Discard patch within 12 hours or as directed    ondansetron (ZOFRAN) 8 mg, Oral, Every 8 hours PRN    triamcinolone acetonide 0.1% (KENALOG) 0.1 % cream Topical (Top), 2 times daily, To rash    vitamin D (VITAMIN D3) 1,000 Units, Oral, Daily       Review of Systems   Respiratory:  Negative for cough, chest tightness and shortness of breath.    Cardiovascular:  Negative for leg swelling.   Gastrointestinal:  Positive for nausea (four days after taking wegovy) and reflux (medication helps). Negative for abdominal pain, anal bleeding, blood in stool, change in bowel habit, constipation, diarrhea, rectal pain and vomiting.   Genitourinary:  Negative for bladder incontinence, nocturia, pelvic pain and vaginal bleeding.   Musculoskeletal:  Positive for arthralgias and back pain.   Psychiatric/Behavioral:  Positive for depressed mood (grievning for recent loss brother). Negative for confusion, decreased concentration, sleep disturbance and suicidal ideas. The patient is not nervous/anxious.    All other systems reviewed and are negative.       Visit Vitals  BP (!) 150/82 (BP Location: Left arm, Patient Position: Sitting)   Pulse 105   Temp 97.5 °F (36.4 °C)   Ht 5' 1" (1.549 m)   Wt 76.2 kg (168 lb)   SpO2 97%   BMI 31.74 kg/m²       Physical Exam  Vitals and nursing note reviewed.   Constitutional:       Appearance: Normal appearance.   HENT:      Head: Normocephalic.      Nose: Nose normal.      Mouth/Throat:      Mouth: Mucous membranes are dry.   Eyes:      Extraocular Movements: Extraocular movements intact.      Conjunctiva/sclera: Conjunctivae normal.   Cardiovascular:      Rate and Rhythm: Normal rate and regular rhythm.      Pulses: Normal pulses.      Heart sounds: " Normal heart sounds. No murmur heard.  Pulmonary:      Effort: Pulmonary effort is normal.      Breath sounds: Normal breath sounds.   Abdominal:      General: Bowel sounds are normal.      Palpations: Abdomen is soft.   Musculoskeletal:         General: Normal range of motion.        Arms:       Cervical back: Normal range of motion.      Comments: Muscle spasm lumbar   Skin:     General: Skin is warm and dry.      Capillary Refill: Capillary refill takes less than 2 seconds.   Neurological:      General: No focal deficit present.      Mental Status: She is alert.   Psychiatric:         Mood and Affect: Mood normal.          Labs Reviewed:  Chemistry:  Lab Results   Component Value Date     06/02/2025    K 4.7 06/02/2025    BUN 14 06/02/2025    CREATININE 1.00 06/02/2025    EGFRNORACEVR 60 06/02/2025    CALCIUM 10.1 06/02/2025    ALKPHOS 52 06/02/2025    ALBUMIN 4.4 06/02/2025    AST 20 06/02/2025    ALT 15 06/02/2025    MG 2.10 02/10/2025    [id number] 36 06/02/2025    TSH 1.990 06/02/2025        Lab Results   Component Value Date    HGBA1C 5.6 06/02/2025        Hematology:  Lab Results   Component Value Date    WBC 3.80 (L) 06/02/2025    RBC 4.83 06/02/2025    HGB 14.9 06/02/2025    HCT 42.8 06/02/2025    MCV 88.6 06/02/2025    MCH 30.8 06/02/2025    MCHC 34.8 06/02/2025    RDW 13.4 06/02/2025     06/02/2025    MPV 12.2 06/02/2025       Lipid Panel:  Lab Results   Component Value Date    CHOL 134 06/02/2025    HDL 52 06/02/2025    LDLDIRECT 52.4 06/02/2025    TRIG 96 06/02/2025        Assessment & Plan:  1. Nausea in adult  Assessment & Plan:  colonoscopy from last visit in February but is willing to do it and has noticed a change in her bowel patterns in the last 2 months has been having to actually push the rectal area to the side to be able to enable a bowel movement.       2. Lumbar pain  Assessment & Plan:  Increased lumbar pain worsening of lumbar muscle spasm, tylenol does make restless leg  symptoms worse.  Would like a muscle relaxant to be able to help the pain is not better and willing to have home health but it is difficult for her to get out her home and it is causes great difficulty trying to leave the home would request home health with physical therapy.      3. Tobacco abuse disorder  Assessment & Plan:  It is very important that she quit smoking. There are various alternatives available to help with this difficult task, but first and foremost, she must make a firm commitment and decision to quit. The nature of nicotine addiction is discussed. The usefulness of behavioral therapy is discussed and suggested. The correct use, cost and side effects of nicotine replacement therapy such as gum or patches is discussed. Bupropion and its cost (sometimes not covered fully by insurance) and side effects are reviewed. The quit rates are discussed. I recommend she not allow potential costs of treatment to deter her from using nicotine replacement therapy or bupropion, as the long term economic and health benefits are obvious. Counseling 5 minutes.       4. Bilateral leg cramps  Assessment & Plan:   intermittent leg cramping gabapentin 100 tid prn helping legs to move less at night.  Check cmp with mg. Will notify with results of lab draw when available. Continue current treatment until results available.       5. Personal history of colon cancer  Overview:  Two thousand nineteen colon cancer discovered with a colon resection.  She is due for a colonoscopy recheck in October.  Request Dr. Reyes Edwards.  Discussed possibility of setting with an oncologist again since it has not been 5 years since her last evaluation with oncologist.    Assessment & Plan:  2019 colon cancer discovered with a colon resection. She is over due for a colonoscopy recheck in October. Request Addy Patrick or Dr Petty. Discussed possibility of setting with an oncologist again since it has not been 5 years since her last  evaluation with oncologist, but patient refused      6. Restless leg syndrome    7. Anxiety and depression    8. Elevated blood pressure reading in office with white coat syndrome, without diagnosis of hypertension  Assessment & Plan:  Check blood pressure twice a day with pulse goal less than 130/80.  Patient reports always okay at home call in 10 days to see blood pressure reading.      9. Nausea         Assessment & Plan               Future Appointments   Date Time Provider Department Center   8/26/2025  7:20 AM Jaclyn Casiano DNP [facility]       Follow up in about 6 weeks (around 9/1/2025). Call sooner if needed.      This note was generated with the assistance of ambient listening technology. Verbal consent was obtained by the patient and accompanying visitor(s) for the recording of patient appointment to facilitate this note. I attest to having reviewed and edited the generated note for accuracy, though some syntax or spelling errors may persist. Please contact the author of this note for any clarification.      Jaclyn Casiano DNP    Lab Frequency Next Occurrence   X-Ray Chest PA And Lateral Once 08/08/2024   Ambulatory referral/consult to Family Practice Once 08/15/2024   Ambulatory referral/consult to Gynecology Once 06/03/2025   Ambulatory referral/consult to Family [facility] COLONOSCOPY Once 06/03/2025

## 2025-07-21 NOTE — ASSESSMENT & PLAN NOTE
intermittent leg cramping gabapentin 100 tid prn helping legs to move less at night.  Check cmp with mg. Will notify with results of lab draw when available. Continue current treatment until results available.

## 2025-07-21 NOTE — ASSESSMENT & PLAN NOTE
Check blood pressure twice a day with pulse goal less than 130/80.  Patient reports always okay at home call in 10 days to see blood pressure reading.

## 2025-07-21 NOTE — ASSESSMENT & PLAN NOTE
Increased lumbar pain worsening of lumbar muscle spasm, tylenol does make restless leg symptoms worse.  Would like a muscle relaxant to be able to help the pain is not better and willing to have home health but it is difficult for her to get out her home and it is causes great difficulty trying to leave the home would request home health with physical therapy.

## 2025-07-21 NOTE — ASSESSMENT & PLAN NOTE
2019 colon cancer discovered with a colon resection. She is over due for a colonoscopy recheck in October. Request Dr. Yi, Addy Cobb or Dr Petty. Discussed possibility of setting with an oncologist again since it has not been 5 years since her last evaluation with oncologist, but patient refused

## 2025-07-24 ENCOUNTER — TELEPHONE (OUTPATIENT)
Dept: FAMILY MEDICINE | Facility: CLINIC | Age: 75
End: 2025-07-24
Payer: COMMERCIAL

## 2025-07-24 DIAGNOSIS — M62.838 MUSCLE SPASM: Primary | ICD-10-CM

## 2025-07-24 RX ORDER — TIZANIDINE 4 MG/1
4 TABLET ORAL NIGHTLY PRN
Qty: 14 TABLET | Refills: 0 | Status: SHIPPED | OUTPATIENT
Start: 2025-07-24

## 2025-07-24 NOTE — TELEPHONE ENCOUNTER
----- Message from Mendez Champagne sent at 7/24/2025  9:05 AM CDT -----  Pt says she was supposed to have a muscle relaxer sent to [facility] but nothing was sent in

## 2025-07-24 NOTE — TELEPHONE ENCOUNTER
Per last visit note: was support to prescribe muscle relaxant, but did not send in.       Ordered Zanaflex 4 mg nightly, PRN. Qty: 14.

## 2025-08-25 ENCOUNTER — TELEPHONE (OUTPATIENT)
Dept: FAMILY MEDICINE | Facility: CLINIC | Age: 75
End: 2025-08-25
Payer: COMMERCIAL

## 2025-08-25 DIAGNOSIS — F41.9 ANXIETY AND DEPRESSION: ICD-10-CM

## 2025-08-25 DIAGNOSIS — G25.81 RESTLESS LEG SYNDROME: ICD-10-CM

## 2025-08-25 DIAGNOSIS — F32.A ANXIETY AND DEPRESSION: ICD-10-CM

## 2025-08-25 DIAGNOSIS — M62.838 MUSCLE SPASM: ICD-10-CM

## 2025-08-25 DIAGNOSIS — K59.02 CONSTIPATION DUE TO OUTLET DYSFUNCTION: ICD-10-CM

## 2025-08-25 RX ORDER — TIZANIDINE 4 MG/1
4 TABLET ORAL NIGHTLY PRN
Qty: 14 TABLET | Refills: 0 | Status: SHIPPED | OUTPATIENT
Start: 2025-08-25

## 2025-08-25 RX ORDER — BUPROPION HYDROCHLORIDE 300 MG/1
300 TABLET ORAL DAILY
Qty: 90 TABLET | Refills: 0 | Status: SHIPPED | OUTPATIENT
Start: 2025-08-25

## 2025-08-25 RX ORDER — GABAPENTIN 100 MG/1
100 CAPSULE ORAL 3 TIMES DAILY PRN
Qty: 90 CAPSULE | Refills: 1 | Status: SHIPPED | OUTPATIENT
Start: 2025-08-25

## 2025-08-25 RX ORDER — DOCUSATE SODIUM 100 MG/1
100 CAPSULE, LIQUID FILLED ORAL 2 TIMES DAILY
Qty: 180 CAPSULE | Refills: 0 | Status: SHIPPED | OUTPATIENT
Start: 2025-08-25